# Patient Record
Sex: MALE | Race: WHITE | Employment: OTHER | URBAN - METROPOLITAN AREA
[De-identification: names, ages, dates, MRNs, and addresses within clinical notes are randomized per-mention and may not be internally consistent; named-entity substitution may affect disease eponyms.]

---

## 2017-01-10 RX ORDER — METOPROLOL TARTRATE 50 MG/1
TABLET, FILM COATED ORAL
Qty: 180 TABLET | Refills: 1 | Status: SHIPPED | OUTPATIENT
Start: 2017-01-10 | End: 2017-06-03 | Stop reason: SDUPTHER

## 2017-02-16 DIAGNOSIS — R73.9 BLOOD GLUCOSE ELEVATED: ICD-10-CM

## 2017-02-16 DIAGNOSIS — E78.2 MIXED HYPERLIPIDEMIA: Primary | ICD-10-CM

## 2017-02-23 ENCOUNTER — OFFICE VISIT (OUTPATIENT)
Dept: CARDIOLOGY CLINIC | Age: 82
End: 2017-02-23

## 2017-02-23 VITALS
HEART RATE: 61 BPM | BODY MASS INDEX: 27.58 KG/M2 | SYSTOLIC BLOOD PRESSURE: 120 MMHG | WEIGHT: 182 LBS | DIASTOLIC BLOOD PRESSURE: 64 MMHG | HEIGHT: 68 IN

## 2017-02-23 DIAGNOSIS — I65.23 BILATERAL CAROTID ARTERY STENOSIS: ICD-10-CM

## 2017-02-23 DIAGNOSIS — E78.5 HYPERLIPIDEMIA, UNSPECIFIED HYPERLIPIDEMIA TYPE: ICD-10-CM

## 2017-02-23 DIAGNOSIS — I25.110 ATHEROSCLEROSIS OF NATIVE CORONARY ARTERY OF NATIVE HEART WITH UNSTABLE ANGINA PECTORIS (HCC): ICD-10-CM

## 2017-02-23 DIAGNOSIS — I10 ESSENTIAL HYPERTENSION, BENIGN: Primary | ICD-10-CM

## 2017-02-23 PROCEDURE — G8427 DOCREV CUR MEDS BY ELIG CLIN: HCPCS | Performed by: INTERNAL MEDICINE

## 2017-02-23 PROCEDURE — 99214 OFFICE O/P EST MOD 30 MIN: CPT | Performed by: INTERNAL MEDICINE

## 2017-02-23 PROCEDURE — G8598 ASA/ANTIPLAT THER USED: HCPCS | Performed by: INTERNAL MEDICINE

## 2017-02-23 PROCEDURE — 1123F ACP DISCUSS/DSCN MKR DOCD: CPT | Performed by: INTERNAL MEDICINE

## 2017-02-23 PROCEDURE — G8420 CALC BMI NORM PARAMETERS: HCPCS | Performed by: INTERNAL MEDICINE

## 2017-02-23 PROCEDURE — 93000 ELECTROCARDIOGRAM COMPLETE: CPT | Performed by: INTERNAL MEDICINE

## 2017-02-23 PROCEDURE — 4040F PNEUMOC VAC/ADMIN/RCVD: CPT | Performed by: INTERNAL MEDICINE

## 2017-02-23 PROCEDURE — 1036F TOBACCO NON-USER: CPT | Performed by: INTERNAL MEDICINE

## 2017-02-23 PROCEDURE — G8484 FLU IMMUNIZE NO ADMIN: HCPCS | Performed by: INTERNAL MEDICINE

## 2017-03-03 ENCOUNTER — HOSPITAL ENCOUNTER (OUTPATIENT)
Dept: OTHER | Age: 82
Discharge: HOME | End: 2017-03-03
Attending: INTERNAL MEDICINE | Admitting: INTERNAL MEDICINE

## 2017-03-09 ENCOUNTER — PROCEDURE VISIT (OUTPATIENT)
Dept: CARDIOLOGY CLINIC | Age: 82
End: 2017-03-09

## 2017-03-09 DIAGNOSIS — I49.5 SINOATRIAL NODE DYSFUNCTION (HCC): ICD-10-CM

## 2017-03-09 DIAGNOSIS — Z95.0 PACEMAKER: ICD-10-CM

## 2017-03-09 PROCEDURE — 93280 PM DEVICE PROGR EVAL DUAL: CPT | Performed by: INTERNAL MEDICINE

## 2017-04-17 RX ORDER — ATORVASTATIN CALCIUM 20 MG/1
TABLET, FILM COATED ORAL
Qty: 90 TABLET | Refills: 2 | Status: SHIPPED | OUTPATIENT
Start: 2017-04-17 | End: 2018-01-24 | Stop reason: SDUPTHER

## 2017-06-05 RX ORDER — METOPROLOL TARTRATE 50 MG/1
TABLET, FILM COATED ORAL
Qty: 180 TABLET | Refills: 3 | Status: SHIPPED | OUTPATIENT
Start: 2017-06-05 | End: 2018-06-11 | Stop reason: SDUPTHER

## 2017-06-29 ENCOUNTER — PROCEDURE VISIT (OUTPATIENT)
Dept: CARDIOLOGY CLINIC | Age: 82
End: 2017-06-29

## 2017-06-29 DIAGNOSIS — I49.5 SINOATRIAL NODE DYSFUNCTION (HCC): ICD-10-CM

## 2017-06-29 DIAGNOSIS — Z95.0 PACEMAKER: ICD-10-CM

## 2017-06-29 PROCEDURE — 93280 PM DEVICE PROGR EVAL DUAL: CPT | Performed by: INTERNAL MEDICINE

## 2017-08-18 DIAGNOSIS — E78.5 HYPERLIPIDEMIA, UNSPECIFIED HYPERLIPIDEMIA TYPE: Primary | ICD-10-CM

## 2017-08-24 ENCOUNTER — OFFICE VISIT (OUTPATIENT)
Dept: CARDIOLOGY CLINIC | Age: 82
End: 2017-08-24

## 2017-08-24 VITALS
SYSTOLIC BLOOD PRESSURE: 130 MMHG | BODY MASS INDEX: 27.43 KG/M2 | WEIGHT: 181 LBS | DIASTOLIC BLOOD PRESSURE: 72 MMHG | HEART RATE: 60 BPM | HEIGHT: 68 IN

## 2017-08-24 DIAGNOSIS — E78.5 HYPERLIPIDEMIA, UNSPECIFIED HYPERLIPIDEMIA TYPE: ICD-10-CM

## 2017-08-24 DIAGNOSIS — I25.110 ATHEROSCLEROSIS OF NATIVE CORONARY ARTERY OF NATIVE HEART WITH UNSTABLE ANGINA PECTORIS (HCC): Primary | ICD-10-CM

## 2017-08-24 DIAGNOSIS — I10 ESSENTIAL HYPERTENSION, BENIGN: ICD-10-CM

## 2017-08-24 PROCEDURE — G8419 CALC BMI OUT NRM PARAM NOF/U: HCPCS | Performed by: INTERNAL MEDICINE

## 2017-08-24 PROCEDURE — 99214 OFFICE O/P EST MOD 30 MIN: CPT | Performed by: INTERNAL MEDICINE

## 2017-08-24 PROCEDURE — 1036F TOBACCO NON-USER: CPT | Performed by: INTERNAL MEDICINE

## 2017-08-24 PROCEDURE — 4040F PNEUMOC VAC/ADMIN/RCVD: CPT | Performed by: INTERNAL MEDICINE

## 2017-08-24 PROCEDURE — 1123F ACP DISCUSS/DSCN MKR DOCD: CPT | Performed by: INTERNAL MEDICINE

## 2017-08-24 PROCEDURE — G8598 ASA/ANTIPLAT THER USED: HCPCS | Performed by: INTERNAL MEDICINE

## 2017-08-24 PROCEDURE — G8427 DOCREV CUR MEDS BY ELIG CLIN: HCPCS | Performed by: INTERNAL MEDICINE

## 2017-09-21 ENCOUNTER — PROCEDURE VISIT (OUTPATIENT)
Dept: CARDIOLOGY CLINIC | Age: 82
End: 2017-09-21

## 2017-09-21 DIAGNOSIS — I49.5 SINOATRIAL NODE DYSFUNCTION (HCC): ICD-10-CM

## 2017-09-21 DIAGNOSIS — Z95.0 PACEMAKER: ICD-10-CM

## 2017-09-21 PROCEDURE — 93280 PM DEVICE PROGR EVAL DUAL: CPT | Performed by: INTERNAL MEDICINE

## 2018-01-11 ENCOUNTER — PROCEDURE VISIT (OUTPATIENT)
Dept: CARDIOLOGY CLINIC | Age: 83
End: 2018-01-11

## 2018-01-11 DIAGNOSIS — I49.5 SINOATRIAL NODE DYSFUNCTION (HCC): ICD-10-CM

## 2018-01-11 DIAGNOSIS — Z95.0 PACEMAKER: ICD-10-CM

## 2018-01-11 PROCEDURE — 93280 PM DEVICE PROGR EVAL DUAL: CPT | Performed by: INTERNAL MEDICINE

## 2018-01-24 RX ORDER — ATORVASTATIN CALCIUM 20 MG/1
TABLET, FILM COATED ORAL
Qty: 90 TABLET | Refills: 3 | Status: SHIPPED | OUTPATIENT
Start: 2018-01-24 | End: 2018-12-14 | Stop reason: SDUPTHER

## 2018-02-12 DIAGNOSIS — E78.5 HYPERLIPIDEMIA, UNSPECIFIED HYPERLIPIDEMIA TYPE: Primary | ICD-10-CM

## 2018-02-15 ENCOUNTER — OFFICE VISIT (OUTPATIENT)
Dept: CARDIOLOGY CLINIC | Age: 83
End: 2018-02-15

## 2018-02-15 VITALS
SYSTOLIC BLOOD PRESSURE: 120 MMHG | HEART RATE: 60 BPM | WEIGHT: 180 LBS | HEIGHT: 68 IN | BODY MASS INDEX: 27.28 KG/M2 | DIASTOLIC BLOOD PRESSURE: 62 MMHG

## 2018-02-15 DIAGNOSIS — I10 ESSENTIAL HYPERTENSION, BENIGN: ICD-10-CM

## 2018-02-15 DIAGNOSIS — E78.5 HYPERLIPIDEMIA, UNSPECIFIED HYPERLIPIDEMIA TYPE: ICD-10-CM

## 2018-02-15 DIAGNOSIS — I25.110 ATHEROSCLEROSIS OF NATIVE CORONARY ARTERY OF NATIVE HEART WITH UNSTABLE ANGINA PECTORIS (HCC): Primary | ICD-10-CM

## 2018-02-15 PROCEDURE — 4040F PNEUMOC VAC/ADMIN/RCVD: CPT | Performed by: INTERNAL MEDICINE

## 2018-02-15 PROCEDURE — 1036F TOBACCO NON-USER: CPT | Performed by: INTERNAL MEDICINE

## 2018-02-15 PROCEDURE — G8428 CUR MEDS NOT DOCUMENT: HCPCS | Performed by: INTERNAL MEDICINE

## 2018-02-15 PROCEDURE — G8484 FLU IMMUNIZE NO ADMIN: HCPCS | Performed by: INTERNAL MEDICINE

## 2018-02-15 PROCEDURE — G8419 CALC BMI OUT NRM PARAM NOF/U: HCPCS | Performed by: INTERNAL MEDICINE

## 2018-02-15 PROCEDURE — 99214 OFFICE O/P EST MOD 30 MIN: CPT | Performed by: INTERNAL MEDICINE

## 2018-02-15 PROCEDURE — 1123F ACP DISCUSS/DSCN MKR DOCD: CPT | Performed by: INTERNAL MEDICINE

## 2018-02-15 PROCEDURE — G8598 ASA/ANTIPLAT THER USED: HCPCS | Performed by: INTERNAL MEDICINE

## 2018-02-15 NOTE — LETTER
[address] Cardiology [facility]  [address] [place]. Pushpa Patino [facility] [address]  Phone: [phone]  Fax: [phone]    Lisseth Degroot MD        February 20, 2018     [facility]ian [facility] [address]    Patient: Heriberto Pizano  MR Number: [id number]  YOB: 1934  Date of Visit: 2/15/2018    Dear Dr. Hamm:    Thank you for the request for consultation for Ely Betsy to me for the evaluation of Mr Aleah Dunham. Below are the relevant portions of my assessment and plan of care. [facility]   Cardiac Evaluation      Patient: Heriberto Pizano  YOB: 1934  Date: 2/15/18       Chief Complaint   Patient presents with    Coronary Artery Disease    Hyperlipidemia    Hypertension        Referring provider: MD Hansel    History of Present Illness:   Mr Aleah Dunham comes to the office today for follow up of his coronary disease and hypertension. He has a history of coronary disease with CABG in 2005. He is treated for hypertension and hyperlipidemia. He had a nuclear stress March, 2013. Mr Aleah Dunham had a pacemaker placed 11/14 at [place] [address] after a syncopal episode with HR 30's. Mr Aleah Dunham had been following with both me and [place] [address] cardiologists for pacemaker checks and office visits. He is now just following with me. Today, Mr Aleah Dunham states he has been well. He denies any chest pain, palpitations, SANDHU, dizziness, or edema. He walks his Corgi  twice a day, 6 blocks each. Past Medical History:   has a past medical history of Asthma; CAD (coronary artery disease); and Hyperlipidemia. Surgical History:   has a past surgical history that includes Coronary artery bypass graft. Social History:   reports that he has never smoked. He has never used smokeless tobacco. He reports that he does not drink alcohol or use drugs. Family History:  family history includes Heart Disease in his father and mother.      Allergies:

## 2018-02-15 NOTE — PROGRESS NOTES
[facility]   Cardiac Evaluation      Patient: Estrellita Mcclendon  YOB: 1934  Date: 2/15/18       Chief Complaint   Patient presents with    Coronary Artery Disease    Hyperlipidemia    Hypertension        Referring provider: Liliam Ch MD    History of Present Illness:   Mr Olivia Ortega comes to the office today for follow up of his coronary disease and hypertension. He has a history of coronary disease with CABG in 2005. He is treated for hypertension and hyperlipidemia. He had a nuclear stress March, 2013. Mr Olivia Ortega had a pacemaker placed 11/14 at [facility] after a syncopal episode with HR 30's. Mr Olivia Ortega had been following with both me and [facility] cardiologists for pacemaker checks and office visits. He is now just following with me. Today, Mr Olivia Ortega states he has been well. He denies any chest pain, palpitations, SANDHU, dizziness, or edema. He walks his Corgi  twice a day, 6 blocks each. Past Medical History:   has a past medical history of Asthma; CAD (coronary artery disease); and Hyperlipidemia. Surgical History:   has a past surgical history that includes Coronary artery bypass graft. Social History:   reports that he has never smoked. He has never used smokeless tobacco. He reports that he does not drink alcohol or use drugs. Family History:  family history includes Heart Disease in his father and mother. Allergies:  Gabapentin     Review of Systems:   · Constitutional: there has been no unanticipated weight loss. No change in energy or activity level   · Eyes: No visual changes   · ENT: No Headaches, hearing loss or vertigo. No mouth sores or sore throat. · Cardiovascular: Reviewed in HPI  · Respiratory: No cough or wheezing, no sputum production. No hematemesis. · Gastrointestinal: No abdominal pain, appetite loss, blood in stools. No change in bowel or bladder habits.   · Genitourinary: No nocturia, dysuria, trouble voiding  · Musculoskeletal:  No gait disturbance, weakness or joint complaints. · Integumentary: No rash or pruritis. · Neurological: No headache, change in muscle strength, numbness or tingling. No change in gait, balance, coordination, mood, affect, memory, mentation, behavior. · Psychiatric: No anxiety or depression  · Endocrine: No malaise or fever  · Hematologic/Lymphatic: No abnormal bruising or bleeding, blood clots or swollen lymph nodes. · Allergic/Immunologic: No nasal congestion or hives. Physical Examination:    Vitals:    02/15/18 1028   BP: 120/62   Site: Left Arm   Position: Sitting   Cuff Size: Medium Adult   Pulse: 60   Weight: 180 lb (81.6 kg)   Height: 5' 8\" (1.727 m)     Wt Readings from Last 3 Encounters:   02/15/18 180 lb (81.6 kg)   08/24/17 181 lb (82.1 kg)   02/23/17 182 lb (82.6 kg)     BP Readings from Last 3 Encounters:   02/15/18 120/62   08/24/17 130/72   02/23/17 120/64     Constitutional and General Appearance:  appears stated age  Respiratory:  · Normal excursion and expansion without use of accessory muscles  · Resp Auscultation: Normal breath sounds without dullness  Cardiovascular:  · The apical impulses not displaced  · Heart is regular rate and rhythm with normal S1, S2  · The carotid upstroke is normal, no bruit noted   · JVP is not elevated  · Peripheral pulses are symmetrical  · There is no clubbing, cyanosis of the extremities  · No edema  · Femoral Arteries: 2+ and equal  · Pedal Pulses: 2+ and equal   Abdomen:  · No masses or tenderness  · Normal bowel sounds  Neurological/Psychiatric:  · Alert and oriented x3  · Moves all extremities well  · Exhibits normal gait balance and coordination      Assessment/Plan  1. Other symptoms involving cardiovascular system -stable  Carotid doppler 3/17: 1-15% bilateral  Carotid doppler 3/27/12: 16-49% bilateral stenosis  Carotid doppler 2/10: mild (<40%) bilaterally   2. Other and unspecified hyperlipidemia -stable, labs 2/13/18: ; TRIG 235; HDL 32; LDL 82   3.

## 2018-02-20 NOTE — COMMUNICATION BODY
[address]   Cardiac Evaluation      Patient: Koko Chand  YOB: 1934  Date: 2/15/18       Chief Complaint   Patient presents with    Coronary Artery Disease    Hyperlipidemia    Hypertension        Referring provider: Belen Lobato MD    History of Present Illness:   Mr Nikki Covington comes to the office today for follow up of his coronary disease and hypertension. He has a history of coronary disease with CABG in 2005. He is treated for hypertension and hyperlipidemia. He had a nuclear stress March, 2013. Mr Nikki Covington had a pacemaker placed 11/14 at [facility] after a syncopal episode with HR 30's. Mr Nikki Covington had been following with both me and [facility] cardiologists for pacemaker checks and office visits. He is now just following with me. Today, Mr Nikki Covington states he has been well. He denies any chest pain, palpitations, SANDHU, dizziness, or edema. He walks his Corgi  twice a day, 6 blocks each. Past Medical History:   has a past medical history of Asthma; CAD (coronary artery disease); and Hyperlipidemia. Surgical History:   has a past surgical history that includes Coronary artery bypass graft. Social History:   reports that he has never smoked. He has never used smokeless tobacco. He reports that he does not drink alcohol or use drugs. Family History:  family history includes Heart Disease in his father and mother. Allergies:  Gabapentin     Review of Systems:   · Constitutional: there has been no unanticipated weight loss. No change in energy or activity level   · Eyes: No visual changes   · ENT: No Headaches, hearing loss or vertigo. No mouth sores or sore throat. · Cardiovascular: Reviewed in HPI  · Respiratory: No cough or wheezing, no sputum production. No hematemesis. · Gastrointestinal: No abdominal pain, appetite loss, blood in stools. No change in bowel or bladder habits.   · Genitourinary: No nocturia, dysuria, trouble voiding  · Musculoskeletal:  No gait disturbance, weakness or joint complaints. · Integumentary: No rash or pruritis. · Neurological: No headache, change in muscle strength, numbness or tingling. No change in gait, balance, coordination, mood, affect, memory, mentation, behavior. · Psychiatric: No anxiety or depression  · Endocrine: No malaise or fever  · Hematologic/Lymphatic: No abnormal bruising or bleeding, blood clots or swollen lymph nodes. · Allergic/Immunologic: No nasal congestion or hives. Physical Examination:    Vitals:    02/15/18 1028   BP: 120/62   Site: Left Arm   Position: Sitting   Cuff Size: Medium Adult   Pulse: 60   Weight: 180 lb (81.6 kg)   Height: 5' 8\" (1.727 m)     Wt Readings from Last 3 Encounters:   02/15/18 180 lb (81.6 kg)   08/24/17 181 lb (82.1 kg)   02/23/17 182 lb (82.6 kg)     BP Readings from Last 3 Encounters:   02/15/18 120/62   08/24/17 130/72   02/23/17 120/64     Constitutional and General Appearance:  appears stated age  Respiratory:  · Normal excursion and expansion without use of accessory muscles  · Resp Auscultation: Normal breath sounds without dullness  Cardiovascular:  · The apical impulses not displaced  · Heart is regular rate and rhythm with normal S1, S2  · The carotid upstroke is normal, no bruit noted   · JVP is not elevated  · Peripheral pulses are symmetrical  · There is no clubbing, cyanosis of the extremities  · No edema  · Femoral Arteries: 2+ and equal  · Pedal Pulses: 2+ and equal   Abdomen:  · No masses or tenderness  · Normal bowel sounds  Neurological/Psychiatric:  · Alert and oriented x3  · Moves all extremities well  · Exhibits normal gait balance and coordination      Assessment/Plan  1. Other symptoms involving cardiovascular system -stable  Carotid doppler 3/17: 1-15% bilateral  Carotid doppler 3/27/12: 16-49% bilateral stenosis  Carotid doppler 2/10: mild (<40%) bilaterally   2. Other and unspecified hyperlipidemia -stable, labs 2/13/18: ; TRIG 235; HDL 32; LDL 82   3.

## 2018-04-12 ENCOUNTER — PROCEDURE VISIT (OUTPATIENT)
Dept: CARDIOLOGY CLINIC | Age: 83
End: 2018-04-12

## 2018-04-12 DIAGNOSIS — I49.5 SINOATRIAL NODE DYSFUNCTION (HCC): ICD-10-CM

## 2018-04-12 DIAGNOSIS — Z95.0 PACEMAKER: ICD-10-CM

## 2018-04-12 PROCEDURE — 93280 PM DEVICE PROGR EVAL DUAL: CPT | Performed by: INTERNAL MEDICINE

## 2018-04-16 ENCOUNTER — NURSE ONLY (OUTPATIENT)
Dept: CARDIOLOGY CLINIC | Age: 83
End: 2018-04-16

## 2018-04-16 VITALS — SYSTOLIC BLOOD PRESSURE: 130 MMHG | DIASTOLIC BLOOD PRESSURE: 70 MMHG

## 2018-06-11 RX ORDER — METOPROLOL TARTRATE 50 MG/1
TABLET, FILM COATED ORAL
Qty: 180 TABLET | Refills: 3 | Status: SHIPPED | OUTPATIENT
Start: 2018-06-11 | End: 2019-01-25 | Stop reason: SDUPTHER

## 2018-08-27 DIAGNOSIS — E78.5 HYPERLIPIDEMIA, UNSPECIFIED HYPERLIPIDEMIA TYPE: Primary | ICD-10-CM

## 2018-08-29 LAB
ALT SERPL-CCNC: 32 U/L (ref 9–46)
AST SERPL-CCNC: 27 U/L (ref 10–35)
CHOLESTEROL, TOTAL: 126 MG/DL
CHOLESTEROL/HDL RATIO: 3.8 (CALC)
CHOLESTEROL: 93 MG/DL (CALC)
HDLC SERPL-MCNC: 33 MG/DL
LDL CHOLESTEROL CALCULATED: 67 MG/DL (CALC)
TRIGL SERPL-MCNC: 181 MG/DL

## 2018-08-30 ENCOUNTER — OFFICE VISIT (OUTPATIENT)
Dept: CARDIOLOGY CLINIC | Age: 83
End: 2018-08-30

## 2018-08-30 VITALS
HEART RATE: 56 BPM | SYSTOLIC BLOOD PRESSURE: 122 MMHG | HEIGHT: 68 IN | DIASTOLIC BLOOD PRESSURE: 70 MMHG | WEIGHT: 176 LBS | BODY MASS INDEX: 26.67 KG/M2

## 2018-08-30 DIAGNOSIS — E78.49 OTHER HYPERLIPIDEMIA: ICD-10-CM

## 2018-08-30 DIAGNOSIS — I65.23 BILATERAL CAROTID ARTERY STENOSIS: ICD-10-CM

## 2018-08-30 DIAGNOSIS — I25.10 ATHEROSCLEROSIS OF NATIVE CORONARY ARTERY OF NATIVE HEART WITHOUT ANGINA PECTORIS: ICD-10-CM

## 2018-08-30 DIAGNOSIS — I10 ESSENTIAL HYPERTENSION, BENIGN: ICD-10-CM

## 2018-08-30 DIAGNOSIS — Z95.0 PACEMAKER: ICD-10-CM

## 2018-08-30 DIAGNOSIS — R73.09 ELEVATED GLUCOSE: Primary | ICD-10-CM

## 2018-08-30 PROCEDURE — 1123F ACP DISCUSS/DSCN MKR DOCD: CPT | Performed by: INTERNAL MEDICINE

## 2018-08-30 PROCEDURE — 99214 OFFICE O/P EST MOD 30 MIN: CPT | Performed by: INTERNAL MEDICINE

## 2018-08-30 PROCEDURE — 1036F TOBACCO NON-USER: CPT | Performed by: INTERNAL MEDICINE

## 2018-08-30 PROCEDURE — G8419 CALC BMI OUT NRM PARAM NOF/U: HCPCS | Performed by: INTERNAL MEDICINE

## 2018-08-30 PROCEDURE — G8598 ASA/ANTIPLAT THER USED: HCPCS | Performed by: INTERNAL MEDICINE

## 2018-08-30 PROCEDURE — 1101F PT FALLS ASSESS-DOCD LE1/YR: CPT | Performed by: INTERNAL MEDICINE

## 2018-08-30 PROCEDURE — 4040F PNEUMOC VAC/ADMIN/RCVD: CPT | Performed by: INTERNAL MEDICINE

## 2018-08-30 PROCEDURE — G8427 DOCREV CUR MEDS BY ELIG CLIN: HCPCS | Performed by: INTERNAL MEDICINE

## 2018-08-30 NOTE — LETTER
· Constitutional: there has been no unanticipated weight loss. No change in energy or activity level   · Eyes: No visual changes   · ENT: No Headaches, hearing loss or vertigo. No mouth sores or sore throat. · Cardiovascular: Reviewed in HPI  · Respiratory: No cough or wheezing, no sputum production. No hematemesis. · Gastrointestinal: No abdominal pain, appetite loss, blood in stools. No change in bowel or bladder habits. · Genitourinary: No nocturia, dysuria, trouble voiding  · Musculoskeletal:  No gait disturbance, weakness or joint complaints. · Integumentary: No rash or pruritis. · Neurological: No headache, change in muscle strength, numbness or tingling. No change in gait, balance, coordination, mood, affect, memory, mentation, behavior. · Psychiatric: No anxiety or depression  · Endocrine: No malaise or fever  · Hematologic/Lymphatic: No abnormal bruising or bleeding, blood clots or swollen lymph nodes. · Allergic/Immunologic: No nasal congestion or hives.     Physical Examination:    Vitals:    08/30/18 1025   BP: 122/70   Site: Right Arm   Position: Sitting   Cuff Size: Medium Adult   Pulse: 56   Weight: 176 lb (79.8 kg)   Height: 5' 8\" (1.727 m)     Wt Readings from Last 3 Encounters:   02/15/18 180 lb (81.6 kg)   08/24/17 181 lb (82.1 kg)   02/23/17 182 lb (82.6 kg)     BP Readings from Last 3 Encounters:   04/16/18 130/70   02/15/18 120/62   08/24/17 130/72     Constitutional and General Appearance:  appears stated age  Respiratory:  · Normal excursion and expansion without use of accessory muscles  · Resp Auscultation: Normal breath sounds without dullness  Cardiovascular:  · The apical impulses not displaced  · Heart is regular rate and rhythm with normal S1, S2  · The carotid upstroke is normal, no bruit noted   · JVP is not elevated  · Peripheral pulses are symmetrical  · There is no clubbing, cyanosis of the extremities  · No edema  · Femoral Arteries: 2+ and equal

## 2018-08-30 NOTE — COMMUNICATION BODY
trouble voiding  · Musculoskeletal:  No gait disturbance, weakness or joint complaints. · Integumentary: No rash or pruritis. · Neurological: No headache, change in muscle strength, numbness or tingling. No change in gait, balance, coordination, mood, affect, memory, mentation, behavior. · Psychiatric: No anxiety or depression  · Endocrine: No malaise or fever  · Hematologic/Lymphatic: No abnormal bruising or bleeding, blood clots or swollen lymph nodes. · Allergic/Immunologic: No nasal congestion or hives. Physical Examination:    Vitals:    08/30/18 1025   BP: 122/70   Site: Right Arm   Position: Sitting   Cuff Size: Medium Adult   Pulse: 56   Weight: 176 lb (79.8 kg)   Height: 5' 8\" (1.727 m)     Wt Readings from Last 3 Encounters:   02/15/18 180 lb (81.6 kg)   08/24/17 181 lb (82.1 kg)   02/23/17 182 lb (82.6 kg)     BP Readings from Last 3 Encounters:   04/16/18 130/70   02/15/18 120/62   08/24/17 130/72     Constitutional and General Appearance:  appears stated age  Respiratory:  · Normal excursion and expansion without use of accessory muscles  · Resp Auscultation: Normal breath sounds without dullness  Cardiovascular:  · The apical impulses not displaced  · Heart is regular rate and rhythm with normal S1, S2  · The carotid upstroke is normal, no bruit noted   · JVP is not elevated  · Peripheral pulses are symmetrical  · There is no clubbing, cyanosis of the extremities  · No edema  · Femoral Arteries: 2+ and equal  · Pedal Pulses: 2+ and equal   Abdomen:  · No masses or tenderness  · Normal bowel sounds  Neurological/Psychiatric:  · Alert and oriented x3  · Moves all extremities well  · Exhibits normal gait balance and coordination    Assessment:  1.  Coronary  artery disease: Stable, no anginal symptoms  CABG 6/3/05> LIMA-LAD, SVG-diagonal and OM, SVG-posterior ventricular and posterior descending branches of RC system    --GXT Edvin 11/14> ([facility]) Abnormal myocardial perfusion study with a large severe

## 2018-08-31 ENCOUNTER — TELEPHONE (OUTPATIENT)
Dept: CARDIOLOGY CLINIC | Age: 83
End: 2018-08-31

## 2018-09-20 ENCOUNTER — PROCEDURE VISIT (OUTPATIENT)
Dept: CARDIOLOGY CLINIC | Age: 83
End: 2018-09-20

## 2018-09-20 DIAGNOSIS — I49.5 SINOATRIAL NODE DYSFUNCTION (HCC): ICD-10-CM

## 2018-09-20 DIAGNOSIS — Z95.0 PACEMAKER: ICD-10-CM

## 2018-09-20 PROCEDURE — 93280 PM DEVICE PROGR EVAL DUAL: CPT | Performed by: INTERNAL MEDICINE

## 2018-12-14 RX ORDER — ATORVASTATIN CALCIUM 20 MG/1
TABLET, FILM COATED ORAL
Qty: 90 TABLET | Refills: 3 | Status: SHIPPED | OUTPATIENT
Start: 2018-12-14 | End: 2019-01-02 | Stop reason: SDUPTHER

## 2019-01-02 RX ORDER — ATORVASTATIN CALCIUM 20 MG/1
TABLET, FILM COATED ORAL
Qty: 90 TABLET | Refills: 3 | Status: SHIPPED | OUTPATIENT
Start: 2019-01-02 | End: 2019-09-05

## 2019-01-10 ENCOUNTER — PROCEDURE VISIT (OUTPATIENT)
Dept: CARDIOLOGY CLINIC | Age: 84
End: 2019-01-10
Payer: MEDICARE

## 2019-01-10 DIAGNOSIS — I49.5 SINOATRIAL NODE DYSFUNCTION (HCC): ICD-10-CM

## 2019-01-10 DIAGNOSIS — Z95.0 PACEMAKER: ICD-10-CM

## 2019-01-10 PROCEDURE — 93280 PM DEVICE PROGR EVAL DUAL: CPT | Performed by: INTERNAL MEDICINE

## 2019-01-25 RX ORDER — METOPROLOL TARTRATE 50 MG/1
TABLET, FILM COATED ORAL
Qty: 180 TABLET | Refills: 3 | Status: SHIPPED | OUTPATIENT
Start: 2019-01-25 | End: 2019-10-30 | Stop reason: SDUPTHER

## 2019-02-18 DIAGNOSIS — E78.49 OTHER HYPERLIPIDEMIA: Primary | ICD-10-CM

## 2019-02-20 ENCOUNTER — ANTI-COAG VISIT (OUTPATIENT)
Dept: CARDIOLOGY CLINIC | Age: 84
End: 2019-02-20

## 2019-02-21 ENCOUNTER — OFFICE VISIT (OUTPATIENT)
Dept: CARDIOLOGY CLINIC | Age: 84
End: 2019-02-21
Payer: MEDICARE

## 2019-02-21 VITALS
SYSTOLIC BLOOD PRESSURE: 144 MMHG | BODY MASS INDEX: 26.67 KG/M2 | OXYGEN SATURATION: 98 % | WEIGHT: 176 LBS | DIASTOLIC BLOOD PRESSURE: 72 MMHG | HEART RATE: 60 BPM | HEIGHT: 68 IN

## 2019-02-21 DIAGNOSIS — E78.49 OTHER HYPERLIPIDEMIA: ICD-10-CM

## 2019-02-21 DIAGNOSIS — Z95.0 PACEMAKER: ICD-10-CM

## 2019-02-21 DIAGNOSIS — I10 ESSENTIAL HYPERTENSION, BENIGN: ICD-10-CM

## 2019-02-21 DIAGNOSIS — I25.10 ATHEROSCLEROSIS OF NATIVE CORONARY ARTERY OF NATIVE HEART WITHOUT ANGINA PECTORIS: Primary | ICD-10-CM

## 2019-02-21 PROCEDURE — 1101F PT FALLS ASSESS-DOCD LE1/YR: CPT | Performed by: INTERNAL MEDICINE

## 2019-02-21 PROCEDURE — G8484 FLU IMMUNIZE NO ADMIN: HCPCS | Performed by: INTERNAL MEDICINE

## 2019-02-21 PROCEDURE — 1036F TOBACCO NON-USER: CPT | Performed by: INTERNAL MEDICINE

## 2019-02-21 PROCEDURE — G8419 CALC BMI OUT NRM PARAM NOF/U: HCPCS | Performed by: INTERNAL MEDICINE

## 2019-02-21 PROCEDURE — 4040F PNEUMOC VAC/ADMIN/RCVD: CPT | Performed by: INTERNAL MEDICINE

## 2019-02-21 PROCEDURE — 1123F ACP DISCUSS/DSCN MKR DOCD: CPT | Performed by: INTERNAL MEDICINE

## 2019-02-21 PROCEDURE — G8598 ASA/ANTIPLAT THER USED: HCPCS | Performed by: INTERNAL MEDICINE

## 2019-02-21 PROCEDURE — G8427 DOCREV CUR MEDS BY ELIG CLIN: HCPCS | Performed by: INTERNAL MEDICINE

## 2019-02-21 PROCEDURE — 99214 OFFICE O/P EST MOD 30 MIN: CPT | Performed by: INTERNAL MEDICINE

## 2019-04-25 ENCOUNTER — PROCEDURE VISIT (OUTPATIENT)
Dept: CARDIOLOGY CLINIC | Age: 84
End: 2019-04-25
Payer: MEDICARE

## 2019-04-25 DIAGNOSIS — I49.5 SINOATRIAL NODE DYSFUNCTION (HCC): ICD-10-CM

## 2019-04-25 DIAGNOSIS — Z95.0 PACEMAKER: ICD-10-CM

## 2019-04-25 PROCEDURE — 93280 PM DEVICE PROGR EVAL DUAL: CPT | Performed by: INTERNAL MEDICINE

## 2019-04-25 NOTE — PROGRESS NOTES
Patient comes in for programming evaluation for his pacemaker. All sensing and pacing parameters are within normal range. Noted NSVT, pt is on Lopressor. No changes need to be made at this time. Please see interrogation for more detail. Patient will follow up in 3 months in office or remotely.

## 2019-07-25 ENCOUNTER — NURSE ONLY (OUTPATIENT)
Dept: CARDIOLOGY CLINIC | Age: 84
End: 2019-07-25
Payer: MEDICARE

## 2019-07-25 DIAGNOSIS — Z95.0 PACEMAKER: ICD-10-CM

## 2019-07-25 DIAGNOSIS — I49.5 SINOATRIAL NODE DYSFUNCTION (HCC): ICD-10-CM

## 2019-07-25 PROCEDURE — 93280 PM DEVICE PROGR EVAL DUAL: CPT | Performed by: INTERNAL MEDICINE

## 2019-09-03 ENCOUNTER — TELEPHONE (OUTPATIENT)
Dept: CARDIOLOGY CLINIC | Age: 84
End: 2019-09-03

## 2019-09-03 DIAGNOSIS — E78.49 OTHER HYPERLIPIDEMIA: Primary | ICD-10-CM

## 2019-09-05 ENCOUNTER — OFFICE VISIT (OUTPATIENT)
Dept: CARDIOLOGY CLINIC | Age: 84
End: 2019-09-05
Payer: MEDICARE

## 2019-09-05 VITALS
HEIGHT: 68 IN | OXYGEN SATURATION: 98 % | SYSTOLIC BLOOD PRESSURE: 124 MMHG | HEART RATE: 67 BPM | WEIGHT: 169 LBS | DIASTOLIC BLOOD PRESSURE: 60 MMHG | BODY MASS INDEX: 25.61 KG/M2

## 2019-09-05 DIAGNOSIS — E78.49 OTHER HYPERLIPIDEMIA: Primary | ICD-10-CM

## 2019-09-05 DIAGNOSIS — I25.10 ATHEROSCLEROSIS OF NATIVE CORONARY ARTERY OF NATIVE HEART WITHOUT ANGINA PECTORIS: ICD-10-CM

## 2019-09-05 DIAGNOSIS — I10 ESSENTIAL HYPERTENSION, BENIGN: ICD-10-CM

## 2019-09-05 LAB — CHOLESTEROL: 199 MG/DL (CALC)

## 2019-09-05 PROCEDURE — 4040F PNEUMOC VAC/ADMIN/RCVD: CPT | Performed by: INTERNAL MEDICINE

## 2019-09-05 PROCEDURE — G8419 CALC BMI OUT NRM PARAM NOF/U: HCPCS | Performed by: INTERNAL MEDICINE

## 2019-09-05 PROCEDURE — 1123F ACP DISCUSS/DSCN MKR DOCD: CPT | Performed by: INTERNAL MEDICINE

## 2019-09-05 PROCEDURE — 99214 OFFICE O/P EST MOD 30 MIN: CPT | Performed by: INTERNAL MEDICINE

## 2019-09-05 PROCEDURE — G8598 ASA/ANTIPLAT THER USED: HCPCS | Performed by: INTERNAL MEDICINE

## 2019-09-05 PROCEDURE — 1036F TOBACCO NON-USER: CPT | Performed by: INTERNAL MEDICINE

## 2019-09-05 PROCEDURE — G8427 DOCREV CUR MEDS BY ELIG CLIN: HCPCS | Performed by: INTERNAL MEDICINE

## 2019-09-05 RX ORDER — ATORVASTATIN CALCIUM 20 MG/1
TABLET, FILM COATED ORAL
Qty: 90 TABLET | Refills: 3 | Status: SHIPPED | OUTPATIENT
Start: 2019-09-05

## 2019-09-05 RX ORDER — LISINOPRIL 5 MG/1
5 TABLET ORAL
COMMUNITY
Start: 2019-07-17 | End: 2020-12-10

## 2019-09-05 RX ORDER — ATORVASTATIN CALCIUM 20 MG/1
TABLET, FILM COATED ORAL
Qty: 90 TABLET | Refills: 3
Start: 2019-09-05 | End: 2019-09-05 | Stop reason: SDUPTHER

## 2019-09-05 NOTE — LETTER
[address] [place] [address] Cardiology [facility] [place] [address]  Phone: [phone]  Fax: [phone]    Modesto Rnakin MD        September 12, 2019     MD Hansel  [address] [address]    Patient: Lisa Hobbs  MR Number: [id number]  YOB: 1934  Date of Visit: 9/5/2019    Dear Dr. Hamm:    [facility]   Cardiac Evaluation      Patient: Lisa Hobbs  YOB: 1934  Date: 9/5/19     Chief Complaint   Patient presents with    Coronary Artery Disease    Hypertension      Referring provider: MD Hansel    History of Present Illness:   Mr. Lilia Chou comes to the office today for follow up of his coronary disease, hypertension, hyperlipidemia. He has a history of coronary disease with CABG in 2005. He had a nuclear stress March, 2013. He had a pacemaker placed 11/14 at [facility] after a syncopal episode with HR 30's. He had been following with both me and [facility] cardiologists for pacemaker checks and office visits. He is now just following with me. Today, Mr. Lilia Chou states he has been ok. He has an occasional sharp pain on the side of his head. States it is rare. Savage Siegel stopped taking Lipitor because he had joel horses. He states Dr Pauline Torres told him to stop taking it. Savage Siegel denies any chest pain, palpitations, SANDHU, dizziness, or edema. Past Medical History:   has a past medical history of Asthma, CAD (coronary artery disease), and Hyperlipidemia. Surgical History:   has a past surgical history that includes Coronary artery bypass graft. Social History:   reports that he has never smoked. He has never used smokeless tobacco. He reports that he does not drink alcohol or use drugs. Family History:  family history includes Heart Disease in his father and mother. Allergies:  Gabapentin     Review of Systems:   · Constitutional: there has been no unanticipated weight loss.  No change · Normal bowel sounds  Neurological/Psychiatric:  · Alert and oriented x3  · Moves all extremities well  · Exhibits normal gait balance and coordination    Assessment:  1. Coronary  artery disease: Stable, no anginal symptoms  CABG 6/3/05> LIMA-LAD, SVG-diagonal and OM, SVG-posterior ventricular and posterior descending branches of RC system  --GXT Edvin 11/14> ([facility]) Abnormal myocardial perfusion study with a large severe perfusion abnormality inferior wall reflecting myocardial scar. EF 48%  --[facility]14[facility]  EF 45-50%, Aortic valve: Valve area: 2.05cm^2 (Vmax). Left atrium: The  atrium was mildly dilated  --GXT Edvin 3/13> No diagnostic EKG evidence for stress induced ischemia, very frequent PVC's and couplets. Moderate area of decreased perfusion in inferior wall with stress and rest consistent with scar, no evidence for reversible ischemia. EF 45%  --GXT Edvin 7/18/07> small to moderate sized inferolateral fixed defect consistent with infarction   2. Essential hypertension, benign: Stable   3. Other and unspecified hyperlipidemia: labs are 9/5/19: ; TRIG 238; HDL 33; , stopped Lipitor because he had leg cramps, advised to restart   4. Carotid stenosis: Stable  Carotid doppler 3/17> 1-15% bilateral  Carotid doppler 3/27/12> 16-49% bilateral stenosis  Carotid doppler 2/10> mild (<40%) bilaterally   5. Pacemaker, [employer]: placed at [facility] 11/2014 by Dr. Avery Chaves. PPM checked regularly in my office. Not compatible for MRI. Plan:  Lipitor discussed at length. He has been instructed to restart it. Advised to get regular exercise. FU 6 months. Scribe's attestation: This note was scribed in the presence of Dr London Friedman MD by Imelda Logan RN. The scribe's documentation has been prepared under my direction and personally reviewed by me in its entirety. I confirm that the note above accurately reflects all work, treatment, procedures, and medical decision making performed by me.

## 2019-10-31 RX ORDER — METOPROLOL TARTRATE 50 MG/1
TABLET, FILM COATED ORAL
Qty: 180 TABLET | Refills: 3 | Status: SHIPPED | OUTPATIENT
Start: 2019-10-31 | End: 2020-12-10

## 2019-11-14 ENCOUNTER — TELEPHONE (OUTPATIENT)
Dept: CARDIOLOGY CLINIC | Age: 84
End: 2019-11-14

## 2020-01-09 ENCOUNTER — NURSE ONLY (OUTPATIENT)
Dept: CARDIOLOGY CLINIC | Age: 85
End: 2020-01-09
Payer: MEDICARE

## 2020-01-09 PROCEDURE — 93280 PM DEVICE PROGR EVAL DUAL: CPT | Performed by: INTERNAL MEDICINE

## 2020-01-09 NOTE — PROGRESS NOTES
Patient comes in for programming evaluation for his pacemaker. All sensing and pacing parameters are within normal range. Noted short AT and NSVT. Pt is on Lopressor. No changes need to be made at this time. Please see interrogation for more detail. Patient will follow up in 3 months in office or remotely.

## 2020-02-20 ENCOUNTER — OFFICE VISIT (OUTPATIENT)
Dept: CARDIOLOGY CLINIC | Age: 85
End: 2020-02-20
Payer: MEDICARE

## 2020-02-20 VITALS
SYSTOLIC BLOOD PRESSURE: 130 MMHG | DIASTOLIC BLOOD PRESSURE: 60 MMHG | HEART RATE: 60 BPM | WEIGHT: 178 LBS | OXYGEN SATURATION: 98 % | HEIGHT: 68 IN | BODY MASS INDEX: 26.98 KG/M2

## 2020-02-20 PROCEDURE — G8427 DOCREV CUR MEDS BY ELIG CLIN: HCPCS | Performed by: INTERNAL MEDICINE

## 2020-02-20 PROCEDURE — 1123F ACP DISCUSS/DSCN MKR DOCD: CPT | Performed by: INTERNAL MEDICINE

## 2020-02-20 PROCEDURE — G8484 FLU IMMUNIZE NO ADMIN: HCPCS | Performed by: INTERNAL MEDICINE

## 2020-02-20 PROCEDURE — G8417 CALC BMI ABV UP PARAM F/U: HCPCS | Performed by: INTERNAL MEDICINE

## 2020-02-20 PROCEDURE — 99214 OFFICE O/P EST MOD 30 MIN: CPT | Performed by: INTERNAL MEDICINE

## 2020-02-20 PROCEDURE — 4040F PNEUMOC VAC/ADMIN/RCVD: CPT | Performed by: INTERNAL MEDICINE

## 2020-02-20 PROCEDURE — 1036F TOBACCO NON-USER: CPT | Performed by: INTERNAL MEDICINE

## 2020-02-20 NOTE — LETTER
[address] Cardiology [address] [facility]  Phone: [phone]  Fax: [phone]    Kathia Ngo MD        February 21, 2020     Lisa Mcfadden MD  [address] [facility] [address] [facility] [address] [address]    Patient: Klaudia Zepeda  MR Number: [id number]  YOB: 1934  Date of Visit: 2/20/2020    Dear Dr. Gonzalez Signs:    [address]   Cardiac Evaluation      Patient: Klaudia Zepeda  YOB: 1934  Date: 2/20/20     Chief Complaint   Patient presents with    Coronary Artery Disease    Hyperlipidemia    Hypertension      Referring provider: Lisa Mcfadden MD    History of Present Illness:   Mr. Mag Wallace comes to the office today for follow up of his coronary disease, hypertension, hyperlipidemia. He has a history of coronary disease with CABG in 2005. He had a nuclear stress March, 2013. He had a pacemaker placed 11/14 at [facility] after a syncopal episode with HR 30's. He had been following with both me and [facility] cardiologists for pacemaker checks and office visits. He is now just following with me. Today, Mr. Mag Wallace states he has been ok. He denies any chest pain, palpitations, SANDHU, dizziness, or edema. Mr Mag Wallace stays active and has coffee visits with his friends. He does not exercise routinely. Past Medical History:   has a past medical history of Asthma, CAD (coronary artery disease), and Hyperlipidemia. Surgical History:   has a past surgical history that includes Coronary artery bypass graft. Social History:   reports that he has never smoked. He has never used smokeless tobacco. He reports that he does not drink alcohol or use drugs. Family History:  family history includes Heart Disease in his father and mother. Allergies:  Gabapentin     Review of Systems:   · Constitutional: there has been no unanticipated weight loss.  No change in energy or activity level   · Eyes: No visual changes

## 2020-04-09 ENCOUNTER — NURSE ONLY (OUTPATIENT)
Dept: CARDIOLOGY CLINIC | Age: 85
End: 2020-04-09
Payer: MEDICARE

## 2020-04-09 PROCEDURE — 93280 PM DEVICE PROGR EVAL DUAL: CPT | Performed by: INTERNAL MEDICINE

## 2020-07-02 ENCOUNTER — NURSE ONLY (OUTPATIENT)
Dept: CARDIOLOGY CLINIC | Age: 85
End: 2020-07-02
Payer: MEDICARE

## 2020-07-02 PROCEDURE — 93280 PM DEVICE PROGR EVAL DUAL: CPT | Performed by: INTERNAL MEDICINE

## 2020-07-02 NOTE — PROGRESS NOTES
Patient comes in for programming evaluation for his pacemaker. All sensing and pacing parameters are within normal range. Noted NSVT. Pt is on Lopressor. No changes need to be made at this time. Please see interrogation for more detail. Patient will follow up in 3 months in office or remotely.

## 2020-08-31 ENCOUNTER — TELEPHONE (OUTPATIENT)
Dept: CARDIOLOGY CLINIC | Age: 85
End: 2020-08-31

## 2020-09-03 ENCOUNTER — OFFICE VISIT (OUTPATIENT)
Dept: CARDIOLOGY CLINIC | Age: 85
End: 2020-09-03
Payer: MEDICARE

## 2020-09-03 VITALS
HEART RATE: 60 BPM | BODY MASS INDEX: 26.22 KG/M2 | OXYGEN SATURATION: 97 % | WEIGHT: 173 LBS | SYSTOLIC BLOOD PRESSURE: 124 MMHG | DIASTOLIC BLOOD PRESSURE: 60 MMHG | HEIGHT: 68 IN

## 2020-09-03 PROCEDURE — G8417 CALC BMI ABV UP PARAM F/U: HCPCS | Performed by: INTERNAL MEDICINE

## 2020-09-03 PROCEDURE — 1036F TOBACCO NON-USER: CPT | Performed by: INTERNAL MEDICINE

## 2020-09-03 PROCEDURE — 99214 OFFICE O/P EST MOD 30 MIN: CPT | Performed by: INTERNAL MEDICINE

## 2020-09-03 PROCEDURE — 1123F ACP DISCUSS/DSCN MKR DOCD: CPT | Performed by: INTERNAL MEDICINE

## 2020-09-03 PROCEDURE — 4040F PNEUMOC VAC/ADMIN/RCVD: CPT | Performed by: INTERNAL MEDICINE

## 2020-09-03 PROCEDURE — G8427 DOCREV CUR MEDS BY ELIG CLIN: HCPCS | Performed by: INTERNAL MEDICINE

## 2020-09-03 NOTE — PROGRESS NOTES
mentation, behavior. · Psychiatric: No anxiety or depression  · Endocrine: No malaise or fever  · Hematologic/Lymphatic: No abnormal bruising or bleeding, blood clots or swollen lymph nodes. · Allergic/Immunologic: No nasal congestion or hives. Physical Examination:    Vitals:    09/03/20 1059   BP: 124/60   Site: Left Upper Arm   Position: Sitting   Cuff Size: Medium Adult   Pulse: 60   SpO2: 97%   Weight: 173 lb (78.5 kg)   Height: 5' 8\" (1.727 m)     Wt Readings from Last 3 Encounters:   09/03/20 173 lb (78.5 kg)   02/20/20 178 lb (80.7 kg)   09/05/19 169 lb (76.7 kg)     BP Readings from Last 3 Encounters:   09/03/20 124/60   02/20/20 130/60   09/05/19 124/60     Constitutional and General Appearance:  appears stated age  Respiratory:  · Normal excursion and expansion without use of accessory muscles  · Resp Auscultation: Normal breath sounds without dullness  Cardiovascular:  · The apical impulses not displaced  · Heart is regular rate and rhythm with normal S1, S2  · The carotid upstroke is normal, no bruit noted   · JVP is not elevated  · Peripheral pulses are symmetrical  · There is no clubbing, cyanosis of the extremities  · No edema  · Femoral Arteries: 2+ and equal  · Pedal Pulses: 2+ and equal   Abdomen:  · No masses or tenderness  · Normal bowel sounds  Neurological/Psychiatric:  · Alert and oriented x3  · Moves all extremities well  · Exhibits normal gait balance and coordination    Assessment:  1. Coronary  artery disease: Stable, no anginal symptoms  CABG 6/3/05> LIMA-LAD, SVG-diagonal and OM, SVG-posterior ventricular and posterior descending branches of RC system  --GXT Edvin 11/14> ([address]) Abnormal myocardial perfusion study with a large severe perfusion abnormality inferior wall reflecting myocardial scar. EF 48%  --Echo 11/14> ([address] [facility])  EF 45-50%, Aortic valve: Valve area: 2.05cm^2 (Vmax).  Left atrium: The  atrium was mildly dilated  --GXT Edvin 3/13> No diagnostic EKG evidence for stress induced ischemia, very frequent PVC's and couplets. Moderate area of decreased perfusion in inferior wall with stress and rest consistent with scar, no evidence for reversible ischemia. EF 45%  --GXT Edvin 7/18/07> small to moderate sized inferolateral fixed defect consistent with infarction   2. Essential hypertension, benign: Stable   3. Other and unspecified hyperlipidemia: labs are stable 2/17/20: ; TRIG 181; HDL 35; LDL 74, stopped Lipitor because he had leg cramps, advised to restart at last visit   4. Carotid stenosis: Stable  Carotid doppler 3/17> 1-15% bilateral  Carotid doppler 3/27/12> 16-49% bilateral stenosis  Carotid doppler 2/10> mild (<40%) bilaterally   5. Pacemaker, [employer]: placed at [facility] 11/2014 by Dr. Favian Salinas. PPM checked regularly in my office. Not compatible for MRI. Plan: Thank you for allowing to me to participate in the care of Kae Bruno. Scribe's attestation: This note was scribed in the presence of Dr Janette Killian MD by Julio Dutton, JESUS.

## 2020-09-03 NOTE — LETTER
[address] Cardiology [address] [facility]   Phone: [phone]  Fax: [phone]    Steve Rodriguez MD        September 4, 2020     Abril Orozco MD  [address] [id number] [facility] [address] [address]    Patient: Wally Goldstein  MR Number: [id number]  YOB: 1934  Date of Visit: 9/3/2020    Dear Dr. Parnell:    [facility], [place]   Cardiac Evaluation      Patient: Wally Goldstein  YOB: 1934  Date: 9/3/20     Chief Complaint   Patient presents with    Coronary Artery Disease    Hypertension      Referring provider: Abril Orozco MD    History of Present Illness:   Mr. Winston Jorgensen comes to the office today for follow up of his coronary disease, hypertension, hyperlipidemia. He has a history of coronary disease with CABG in 2005. He had a nuclear stress March, 2013. He had a pacemaker placed 11/14 at [facility] after a syncopal episode with HR 30's. He had been following with both me and [facility] cardiologists for pacemaker checks and office visits. He is now just following with me. Today, Mr. Winston Jorgensen     Past Medical History:   has a past medical history of Asthma, CAD (coronary artery disease), and Hyperlipidemia. Surgical History:   has a past surgical history that includes Coronary artery bypass graft. Social History:   reports that he has never smoked. He has never used smokeless tobacco. He reports that he does not drink alcohol or use drugs. Family History:  family history includes Heart Disease in his father and mother. Allergies:  Gabapentin     Review of Systems:   · Constitutional: there has been no unanticipated weight loss. No change in energy or activity level   · Eyes: No visual changes   · ENT: No Headaches, hearing loss or vertigo. No mouth sores or sore throat. · Cardiovascular: Reviewed in HPI  · Respiratory: No cough or wheezing, no sputum production. No hematemesis. CABG 6/3/05> LIMA-LAD, SVG-diagonal and OM, SVG-posterior ventricular and posterior descending branches of RC system  --GXT Edvin 11/14> ([facility]) Abnormal myocardial perfusion study with a large severe perfusion abnormality inferior wall reflecting myocardial scar. EF 48%  --Echo 11/14> ([facility])  EF 45-50%, Aortic valve: Valve area: 2.05cm^2 (Vmax). Left atrium: The  atrium was mildly dilated  --GXT Edvin 3/13> No diagnostic EKG evidence for stress induced ischemia, very frequent PVC's and couplets. Moderate area of decreased perfusion in inferior wall with stress and rest consistent with scar, no evidence for reversible ischemia. EF 45%  --GXT Edvin 7/18/07> small to moderate sized inferolateral fixed defect consistent with infarction   2. Essential hypertension, benign: Stable   3. Other and unspecified hyperlipidemia: labs are stable 2/17/20: ; TRIG 181; HDL 35; LDL 74, stopped Lipitor because he had leg cramps, advised to restart at last visit   4. Carotid stenosis: Stable  Carotid doppler 3/17> 1-15% bilateral  Carotid doppler 3/27/12> 16-49% bilateral stenosis  Carotid doppler 2/10> mild (<40%) bilaterally   5. Pacemaker, [place] [employer]: placed at [facility] 11/2014 by Dr. Favian Salinas. PPM checked regularly in my office. Not compatible for MRI. Plan: Thank you for allowing to me to participate in the care of Kae Bruno. Scribe's attestation: This note was scribed in the presence of Dr Janette Killian MD by Julio Dutton, JESUS. [facility]   Cardiac Evaluation      Patient: Kae Bruno  YOB: 1934  Date: 9/3/20     Chief Complaint   Patient presents with    Coronary Artery Disease    Hypertension      Referring provider: Jonn Wesley MD    History of Present Illness:   Mr. Yasmeen Silveira comes to the office today for follow up of his coronary disease, hypertension, hyperlipidemia.  He has a history of coronary disease with CABG in 2005. He had a nuclear stress March, 2013. He had a pacemaker placed 11/14 at [facility] after a syncopal episode with HR 30's. He had been following with both me and [facility] cardiologists for pacemaker checks and office visits. He is now just following with me. Today, Mr. Yanci Young states he has been ok. He rides a stationary bike 5 minutes twice a day. [employer] works in his yard and mows the lawn with a push mower. He denies any chest pain, palpitations, SANDHU, dizziness, or edema. His only complaint is trouble sleeping. Past Medical History:   has a past medical history of Asthma, CAD (coronary artery disease), and Hyperlipidemia. Surgical History:   has a past surgical history that includes Coronary artery bypass graft. Social History:   reports that he has never smoked. He has never used smokeless tobacco. He reports that he does not drink alcohol or use drugs. Family History:  family history includes Heart Disease in his father and mother. Allergies:  Gabapentin     Review of Systems:   · Constitutional: there has been no unanticipated weight loss. No change in energy or activity level   · Eyes: No visual changes   · ENT: No Headaches, hearing loss or vertigo. No mouth sores or sore throat. · Cardiovascular: Reviewed in HPI  · Respiratory: No cough or wheezing, no sputum production. No hematemesis. · Gastrointestinal: No abdominal pain, appetite loss, blood in stools. No change in bowel or bladder habits. · Genitourinary: No nocturia, dysuria, trouble voiding  · Musculoskeletal:  No gait disturbance, weakness or joint complaints. · Integumentary: No rash or pruritis. · Neurological: No headache, change in muscle strength, numbness or tingling. No change in gait, balance, coordination, mood, affect, memory, mentation, behavior.   · Psychiatric: No anxiety or depression  · Endocrine: No malaise or fever  · Hematologic/Lymphatic: No abnormal bruising or bleeding, blood clots or swollen lymph nodes. · Allergic/Immunologic: No nasal congestion or hives. Physical Examination:    Vitals:    09/03/20 1059   BP: 124/60   Site: Left Upper Arm   Position: Sitting   Cuff Size: Medium Adult   Pulse: 60   SpO2: 97%   Weight: 173 lb (78.5 kg)   Height: 5' 8\" (1.727 m)     Wt Readings from Last 3 Encounters:   09/03/20 173 lb (78.5 kg)   02/20/20 178 lb (80.7 kg)   09/05/19 169 lb (76.7 kg)     BP Readings from Last 3 Encounters:   09/03/20 124/60   02/20/20 130/60   09/05/19 124/60     Constitutional and General Appearance:  appears stated age  Respiratory:  · Normal excursion and expansion without use of accessory muscles  · Resp Auscultation: Normal breath sounds without dullness  Cardiovascular:  · The apical impulses not displaced  · Heart is regular rate and rhythm with normal S1, S2  · The carotid upstroke is normal, no bruit noted   · JVP is not elevated  · Peripheral pulses are symmetrical  · There is no clubbing, cyanosis of the extremities  · No edema  · Femoral Arteries: 2+ and equal  · Pedal Pulses: 2+ and equal   Abdomen:  · No masses or tenderness  · Normal bowel sounds  Neurological/Psychiatric:  · Alert and oriented x3  · Moves all extremities well  · Exhibits normal gait balance and coordination    Assessment:  1. Coronary  artery disease: Stable, no anginal symptoms  CABG 6/3/05> LIMA-LAD, SVG-diagonal and OM, SVG-posterior ventricular and posterior descending branches of RC system  --GXT Edvin 11/14> ([facility]) Abnormal myocardial perfusion study with a large severe perfusion abnormality inferior wall reflecting myocardial scar. EF 48%  --Echo 11/14> ([facility])  EF 45-50%, Aortic valve: Valve area: 2.05cm^2 (Vmax). Left atrium: The  atrium was mildly dilated  --GXT Edvin 3/13> No diagnostic EKG evidence for stress induced ischemia, very frequent PVC's and couplets.  Moderate area of decreased perfusion in inferior wall with stress and rest consistent with scar, no evidence for reversible ischemia. EF 45%  --GXT Edvin 7/18/07> small to moderate sized inferolateral fixed defect consistent with infarction   2. Essential hypertension, benign: Stable   3. Other and unspecified hyperlipidemia: labs are stable 9/1/20: ; TRIG 209; HDL 35; LDL 72, stopped Lipitor because he had leg cramps, advised to restart at last visit   4. Carotid stenosis: Stable  Carotid doppler 3/17> 1-15% bilateral  Carotid doppler 3/27/12> 16-49% bilateral stenosis  Carotid doppler 2/10> mild (<40%) bilaterally   5. Pacemaker, [place] [employer]: placed at [facility] 11/2014 by Dr. Lelan Fothergill. PPM checked regularly in my office. Not compatible for MRI      Plan:  Mr Winston Jorgensen appears to be stable. No med changes. Discussed him increasing the amount of time on his stationary bike. FU 6 months. Thank you for allowing to me to participate in the care of Wally Goldstein. Scribe's attestation: This note was scribed in the presence of Dr Kingston Montanez MD by Shaun Rodriguez RN. The scribe's documentation has been prepared under my direction and personally reviewed by me in its entirety. I confirm that the note above accurately reflects all work, treatment, procedures, and medical decision making performed by me. If you have questions, please do not hesitate to call me. I look forward to following Mia Valel along with you.     Sincerely,        Steve Rodriguez MD

## 2020-09-03 NOTE — PROGRESS NOTES
[facility]   Cardiac Evaluation      Patient: William Jessica  YOB: 1934  Date: 9/3/20     Chief Complaint   Patient presents with    Coronary Artery Disease    Hypertension      Referring provider: Urban Aceves MD    History of Present Illness:   Mr. Jazmin Truong comes to the office today for follow up of his coronary disease, hypertension, hyperlipidemia. He has a history of coronary disease with CABG in 2005. He had a nuclear stress March, 2013. He had a pacemaker placed 11/14 at [facility] after a syncopal episode with HR 30's. He had been following with both me and [facility] cardiologists for pacemaker checks and office visits. He is now just following with me. Today, Mr. Jazmin Truong states he has been ok. He rides a stationary bike 5 minutes twice a day. Jl Bell works in his yard and mows the lawn with a push mower. He denies any chest pain, palpitations, SANDHU, dizziness, or edema. His only complaint is trouble sleeping. Past Medical History:   has a past medical history of Asthma, CAD (coronary artery disease), and Hyperlipidemia. Surgical History:   has a past surgical history that includes Coronary artery bypass graft. Social History:   reports that he has never smoked. He has never used smokeless tobacco. He reports that he does not drink alcohol or use drugs. Family History:  family history includes Heart Disease in his father and mother. Allergies:  Gabapentin     Review of Systems:   · Constitutional: there has been no unanticipated weight loss. No change in energy or activity level   · Eyes: No visual changes   · ENT: No Headaches, hearing loss or vertigo. No mouth sores or sore throat. · Cardiovascular: Reviewed in HPI  · Respiratory: No cough or wheezing, no sputum production. No hematemesis. · Gastrointestinal: No abdominal pain, appetite loss, blood in stools. No change in bowel or bladder habits.   · Genitourinary: No nocturia, dysuria, trouble voiding  · Musculoskeletal:  No gait disturbance, weakness or joint complaints. · Integumentary: No rash or pruritis. · Neurological: No headache, change in muscle strength, numbness or tingling. No change in gait, balance, coordination, mood, affect, memory, mentation, behavior. · Psychiatric: No anxiety or depression  · Endocrine: No malaise or fever  · Hematologic/Lymphatic: No abnormal bruising or bleeding, blood clots or swollen lymph nodes. · Allergic/Immunologic: No nasal congestion or hives. Physical Examination:    Vitals:    09/03/20 1059   BP: 124/60   Site: Left Upper Arm   Position: Sitting   Cuff Size: Medium Adult   Pulse: 60   SpO2: 97%   Weight: 173 lb (78.5 kg)   Height: 5' 8\" (1.727 m)     Wt Readings from Last 3 Encounters:   09/03/20 173 lb (78.5 kg)   02/20/20 178 lb (80.7 kg)   09/05/19 169 lb (76.7 kg)     BP Readings from Last 3 Encounters:   09/03/20 124/60   02/20/20 130/60   09/05/19 124/60     Constitutional and General Appearance:  appears stated age  Respiratory:  · Normal excursion and expansion without use of accessory muscles  · Resp Auscultation: Normal breath sounds without dullness  Cardiovascular:  · The apical impulses not displaced  · Heart is regular rate and rhythm with normal S1, S2  · The carotid upstroke is normal, no bruit noted   · JVP is not elevated  · Peripheral pulses are symmetrical  · There is no clubbing, cyanosis of the extremities  · No edema  · Femoral Arteries: 2+ and equal  · Pedal Pulses: 2+ and equal   Abdomen:  · No masses or tenderness  · Normal bowel sounds  Neurological/Psychiatric:  · Alert and oriented x3  · Moves all extremities well  · Exhibits normal gait balance and coordination    Assessment:  1.  Coronary  artery disease: Stable, no anginal symptoms  CABG 6/3/05> LIMA-LAD, SVG-diagonal and OM, SVG-posterior ventricular and posterior descending branches of RC system  --GXT Edvin 11/14> ([address]) Abnormal myocardial perfusion study with a large severe perfusion abnormality inferior wall reflecting myocardial scar. EF 48%  --[facility]/14> [facility]  EF 45-50%, Aortic valve: Valve area: 2.05cm^2 (Vmax). Left atrium: The  atrium was mildly dilated  --GXT Edvin 3/13> No diagnostic EKG evidence for stress induced ischemia, very frequent PVC's and couplets. Moderate area of decreased perfusion in inferior wall with stress and rest consistent with scar, no evidence for reversible ischemia. EF 45%  --GXT Edvin 7/18/07> small to moderate sized inferolateral fixed defect consistent with infarction   2. Essential hypertension, benign: Stable   3. Other and unspecified hyperlipidemia: labs are stable 9/1/20: ; TRIG 209; HDL 35; LDL 72, stopped Lipitor because he had leg cramps, advised to restart at last visit   4. Carotid stenosis: Stable  Carotid doppler 3/17> 1-15% bilateral  Carotid doppler 3/27/12> 16-49% bilateral stenosis  Carotid doppler 2/10> mild (<40%) bilaterally   5. Pacemaker, [place] [employer]: placed at [facility] 11/2014 by Dr. Rashad Miller. PPM checked regularly in my office. Not compatible for MRI      Plan:  Mr Hardeep Blank appears to be stable. No med changes. Discussed him increasing the amount of time on his stationary bike. FU 6 months. Thank you for allowing to me to participate in the care of Yasmin Monique. Scribe's attestation: This note was scribed in the presence of Dr Andrés Scruggs MD by Jose Durant RN. The scribe's documentation has been prepared under my direction and personally reviewed by me in its entirety. I confirm that the note above accurately reflects all work, treatment, procedures, and medical decision making performed by me.

## 2020-10-08 ENCOUNTER — NURSE ONLY (OUTPATIENT)
Dept: CARDIOLOGY CLINIC | Age: 85
End: 2020-10-08
Payer: MEDICARE

## 2020-10-08 PROCEDURE — 93280 PM DEVICE PROGR EVAL DUAL: CPT | Performed by: INTERNAL MEDICINE

## 2020-10-08 NOTE — PROGRESS NOTES
Patient comes in for programming evaluation for hisw pacemaker. All sensing and pacing parameters are within normal range. Noted NSVT. Pt is on Lopressor. No changes need to be made at this time. Please see interrogation for more detail. Patient will follow up in 3 months in office or remotely.

## 2020-11-23 ENCOUNTER — TELEPHONE (OUTPATIENT)
Dept: CARDIOLOGY CLINIC | Age: 85
End: 2020-11-23

## 2020-11-23 NOTE — TELEPHONE ENCOUNTER
Patient's daughter called stating he passed out Saturday and is at [facility]. She states he tested positive for Covid and she was told Dr Chantal Can needs to call the hospitalist to discuss discharging him to adjust meds due to orthostatic blood pressures and having a PPM. She states they told her they cannot do anything because he has a PPM. Per Dr Sumi Mcmullen should have a device rep check his PPM the hospitalist can call at any time to discuss his care,  and we can see him after he is discharged and clear from Covid. I relayed this to pt's daughter who verbalized understanding.

## 2020-12-07 ENCOUNTER — TELEPHONE (OUTPATIENT)
Dept: CARDIOLOGY CLINIC | Age: 85
End: 2020-12-07

## 2020-12-07 NOTE — TELEPHONE ENCOUNTER
Daughter wanted Dr. Lauren Leary to know that he has been falling a lot lately. Pt may not mention this during his visit. It is typically happening when he first stands up.

## 2020-12-07 NOTE — TELEPHONE ENCOUNTER
Pt daughter calling pt has an appt with [address] on 12/10. He tested positive for COVID-19 on 10/21/2020 has since been cleared, but did not have another test taken. Does [facility] still want to see pt?  Pls call to advise Thank you

## 2020-12-10 ENCOUNTER — OFFICE VISIT (OUTPATIENT)
Dept: CARDIOLOGY CLINIC | Age: 85
End: 2020-12-10
Payer: MEDICARE

## 2020-12-10 VITALS
BODY MASS INDEX: 25.46 KG/M2 | OXYGEN SATURATION: 97 % | DIASTOLIC BLOOD PRESSURE: 70 MMHG | HEART RATE: 60 BPM | SYSTOLIC BLOOD PRESSURE: 126 MMHG | HEIGHT: 68 IN | WEIGHT: 168 LBS

## 2020-12-10 PROBLEM — R55 SYNCOPE: Status: ACTIVE | Noted: 2020-12-10

## 2020-12-10 PROCEDURE — G8484 FLU IMMUNIZE NO ADMIN: HCPCS | Performed by: INTERNAL MEDICINE

## 2020-12-10 PROCEDURE — 99214 OFFICE O/P EST MOD 30 MIN: CPT | Performed by: INTERNAL MEDICINE

## 2020-12-10 PROCEDURE — 1123F ACP DISCUSS/DSCN MKR DOCD: CPT | Performed by: INTERNAL MEDICINE

## 2020-12-10 PROCEDURE — G8427 DOCREV CUR MEDS BY ELIG CLIN: HCPCS | Performed by: INTERNAL MEDICINE

## 2020-12-10 PROCEDURE — 1036F TOBACCO NON-USER: CPT | Performed by: INTERNAL MEDICINE

## 2020-12-10 PROCEDURE — 4040F PNEUMOC VAC/ADMIN/RCVD: CPT | Performed by: INTERNAL MEDICINE

## 2020-12-10 PROCEDURE — G8417 CALC BMI ABV UP PARAM F/U: HCPCS | Performed by: INTERNAL MEDICINE

## 2020-12-10 RX ORDER — MULTIVIT WITH MINERALS/LUTEIN
250 TABLET ORAL DAILY
COMMUNITY

## 2020-12-10 RX ORDER — METOPROLOL SUCCINATE 50 MG/1
50 TABLET, EXTENDED RELEASE ORAL 2 TIMES DAILY
COMMUNITY
End: 2021-12-28 | Stop reason: SDUPTHER

## 2020-12-10 NOTE — PROGRESS NOTES
[address]   Cardiac Evaluation      Patient: Jason Gray  YOB: 1934  Date: 12/10/20     Chief Complaint   Patient presents with    Coronary Artery Disease    Hypertension      Referring provider: Rennis Cushing, MD    History of Present Illness:   Mr. Abimbola Tyler comes to the office today for follow up of his coronary disease, hypertension, hyperlipidemia. He has a history of coronary disease with CABG in 2005. He had a nuclear stress March, 2013. He had a pacemaker placed 11/14 at [facility] after a syncopal episode with HR 30's. He had been following with both me and [facility] cardiologists for pacemaker checks and office visits. He is now just following with me. Mr Abimbola Tyler was recently hospitalized at [facility] w/ ? Syncopal episode. He states he was changing paper towel roll at home and felt lightheaded and dizzy. He states he fell back into his bathtub. His wife called 46 and he was taken to [facility]. He was orthostatic and given IV fluids. He tested + for Covid, as well. Mr Abimbola Tyler was advised to wear compression hose, which he is doing now. Lisinopril was discontinued and Metoprolol changed to Toprol XL. He states he naps during the day because there is nothing to do at home. He states he still feels lightheaded most of the time. He walks for exercise and feels ok with this. Xu Howell rides a stationary bike <5 minutes twice a day. Past Medical History:   has a past medical history of Asthma, CAD (coronary artery disease), and Hyperlipidemia. Surgical History:   has a past surgical history that includes Coronary artery bypass graft. Social History:   reports that he has never smoked. He has never used smokeless tobacco. He reports that he does not drink alcohol or use drugs. Family History:  family history includes Heart Disease in his father and mother. Allergies:  Gabapentin     Review of Systems:   · Constitutional: there has been no unanticipated weight loss.  No change in energy or activity level   · Eyes: No visual changes   · ENT: No Headaches, hearing loss or vertigo. No mouth sores or sore throat. · Cardiovascular: Reviewed in HPI  · Respiratory: No cough or wheezing, no sputum production. No hematemesis. · Gastrointestinal: No abdominal pain, appetite loss, blood in stools. No change in bowel or bladder habits. · Genitourinary: No nocturia, dysuria, trouble voiding  · Musculoskeletal:  No gait disturbance, weakness or joint complaints. · Integumentary: No rash or pruritis. · Neurological: No headache, change in muscle strength, numbness or tingling. No change in gait, balance, coordination, mood, affect, memory, mentation, behavior. · Psychiatric: No anxiety or depression  · Endocrine: No malaise or fever  · Hematologic/Lymphatic: No abnormal bruising or bleeding, blood clots or swollen lymph nodes. · Allergic/Immunologic: No nasal congestion or hives.     Physical Examination:    Vitals:    12/10/20 1438 12/10/20 1444   BP: 124/70 126/70   Site: Right Upper Arm Right Upper Arm   Position: Sitting Sitting   Cuff Size: Medium Adult Medium Adult   Pulse: 60    SpO2: 97%    Weight: 168 lb (76.2 kg)    Height: 5' 8\" (1.727 m)      Wt Readings from Last 3 Encounters:   12/10/20 168 lb (76.2 kg)   09/03/20 173 lb (78.5 kg)   02/20/20 178 lb (80.7 kg)     BP Readings from Last 3 Encounters:   12/10/20 126/70   09/03/20 124/60   02/20/20 130/60     Constitutional and General Appearance:  appears stated age  Respiratory:  · Normal excursion and expansion without use of accessory muscles  · Resp Auscultation: Normal breath sounds without dullness  Cardiovascular:  · The apical impulses not displaced  · Heart is regular rate and rhythm with normal S1, S2  · The carotid upstroke is normal, no bruit noted   · JVP is not elevated  · Peripheral pulses are symmetrical  · There is no clubbing, cyanosis of the extremities  · No edema  · Femoral Arteries: 2+ and equal  · Pedal Pulses: 2+ and equal Abdomen:  · No masses or tenderness  · Normal bowel sounds  Neurological/Psychiatric:  · Alert and oriented x3  · Moves all extremities well  · Exhibits normal gait balance and coordination    Assessment:  1. Coronary  artery disease: Stable, no anginal symptoms  CABG 6/3/05> LIMA-LAD, SVG-diagonal and OM, SVG-posterior ventricular and posterior descending branches of RC system  --GXT Edvin 11/14> ([facility] Abnormal myocardial perfusion study with a large severe perfusion abnormality inferior wall reflecting myocardial scar. EF 48%  --Echo 11/14> ([facility]  EF 45-50%, Aortic valve: Valve area: 2.05cm^2 (Vmax). Left atrium: The  atrium was mildly dilated  --GXT Edvin 3/13> No diagnostic EKG evidence for stress induced ischemia, very frequent PVC's and couplets. Moderate area of decreased perfusion in inferior wall with stress and rest consistent with scar, no evidence for reversible ischemia. EF 45%  --GXT Edvin 7/18/07> small to moderate sized inferolateral fixed defect consistent with infarction   2. Orthostatic hypotension: 160/80 lying down, 144/80 sitting, 144/80 standing   3. Other and unspecified hyperlipidemia: labs are stable 9/1/20: ; TRIG 209; HDL 35; LDL 72, stopped Lipitor because he had leg cramps, but is back to taking daily   4. Carotid stenosis: Stable  Carotid doppler 3/17> 1-15% bilateral  Carotid doppler 3/27/12> 16-49% bilateral stenosis  Carotid doppler 2/10> mild (<40%) bilaterally   5. Pacemaker, [place] [employer]: [facility] by Dr. Kassie Cade. PPM checked regularly in my office. Not compatible for MRI      Plan:  Stay off Lisinopril. Continue to wear compression hose daily. Recommend increasing riding stationary bike 3 times per day and work up to 10 minutes at a time. FU 3 months. Thank you for allowing to me to participate in the care of Lelo Loja. Scribe's attestation: This note was scribed in the presence of Dr Jessika Mills MD by Barb Calloway RN. The scribe's documentation has been prepared under my direction and personally reviewed by me in its entirety. I confirm that the note above accurately reflects all work, treatment, procedures, and medical decision making performed by me.

## 2020-12-10 NOTE — LETTER
[address] [place] [address] [address]  Phone: [phone]  Fax: [phone]    Davian Rod MD        December 14, 2020     Yunier Connors MD  [id number] [facility] [address] [address]    Patient: Elizabeth Hernandez  MR Number: [id number]  YOB: 1934  Date of Visit: 12/10/2020    Dear Dr. Yunier Connors:    [address]   Cardiac Evaluation      Patient: Elizabeth Hernandez  YOB: 1934  Date: 12/10/20     Chief Complaint   Patient presents with    Coronary Artery Disease    Hypertension      Referring provider: Yunier Connors MD    History of Present Illness:   Mr. Jb Camara comes to the office today for follow up of his coronary disease, hypertension, hyperlipidemia. He has a history of coronary disease with CABG in 2005. He had a nuclear stress March, 2013. He had a pacemaker placed 11/14 at [facility] after a syncopal episode with HR 30's. He had been following with both me and [facility] cardiologists for pacemaker checks and office visits. He is now just following with me. Mr Jb Camara was recently hospitalized at [facility] w/ ? Syncopal episode. He states he was changing paper towel roll at home and felt lightheaded and dizzy. He states he fell back into his bathtub. His wife called 46 and he was taken to [facility]. He was orthostatic and given IV fluids. He tested + for Covid, as well. Mr Jb Camara was advised to wear compression hose, which he is doing now. Lisinopril was discontinued and Metoprolol changed to Toprol XL. He states he naps during the day because there is nothing to do at home. He states he still feels lightheaded most of the time. He walks for exercise and feels ok with this. Julio Cesar Hayward rides a stationary bike <5 minutes twice a day. Past Medical History:   has a past medical history of Asthma, CAD (coronary artery disease), and Hyperlipidemia.     Surgical History: has a past surgical history that includes Coronary artery bypass graft. Social History:   reports that he has never smoked. He has never used smokeless tobacco. He reports that he does not drink alcohol or use drugs. Family History:  family history includes Heart Disease in his father and mother. Allergies:  Gabapentin     Review of Systems:   · Constitutional: there has been no unanticipated weight loss. No change in energy or activity level   · Eyes: No visual changes   · ENT: No Headaches, hearing loss or vertigo. No mouth sores or sore throat. · Cardiovascular: Reviewed in HPI  · Respiratory: No cough or wheezing, no sputum production. No hematemesis. · Gastrointestinal: No abdominal pain, appetite loss, blood in stools. No change in bowel or bladder habits. · Genitourinary: No nocturia, dysuria, trouble voiding  · Musculoskeletal:  No gait disturbance, weakness or joint complaints. · Integumentary: No rash or pruritis. · Neurological: No headache, change in muscle strength, numbness or tingling. No change in gait, balance, coordination, mood, affect, memory, mentation, behavior. · Psychiatric: No anxiety or depression  · Endocrine: No malaise or fever  · Hematologic/Lymphatic: No abnormal bruising or bleeding, blood clots or swollen lymph nodes. · Allergic/Immunologic: No nasal congestion or hives.     Physical Examination:    Vitals:    12/10/20 1438 12/10/20 1444   BP: 124/70 126/70   Site: Right Upper Arm Right Upper Arm   Position: Sitting Sitting   Cuff Size: Medium Adult Medium Adult   Pulse: 60    SpO2: 97%    Weight: 168 lb (76.2 kg)    Height: 5' 8\" (1.727 m)      Wt Readings from Last 3 Encounters:   12/10/20 168 lb (76.2 kg)   09/03/20 173 lb (78.5 kg)   02/20/20 178 lb (80.7 kg)     BP Readings from Last 3 Encounters:   12/10/20 126/70   09/03/20 124/60   02/20/20 130/60     Constitutional and General Appearance:  appears stated age  Respiratory: · Normal excursion and expansion without use of accessory muscles  · Resp Auscultation: Normal breath sounds without dullness  Cardiovascular:  · The apical impulses not displaced  · Heart is regular rate and rhythm with normal S1, S2  · The carotid upstroke is normal, no bruit noted   · JVP is not elevated  · Peripheral pulses are symmetrical  · There is no clubbing, cyanosis of the extremities  · No edema  · Femoral Arteries: 2+ and equal  · Pedal Pulses: 2+ and equal   Abdomen:  · No masses or tenderness  · Normal bowel sounds  Neurological/Psychiatric:  · Alert and oriented x3  · Moves all extremities well  · Exhibits normal gait balance and coordination    Assessment:  1. Coronary  artery disease: Stable, no anginal symptoms  CABG 6/3/05> LIMA-LAD, SVG-diagonal and OM, SVG-posterior ventricular and posterior descending branches of RC system  --[facility] Abnormal myocardial perfusion study with a large severe perfusion abnormality inferior wall reflecting myocardial scar. EF 48%  --Echo 11/14> ([address])  EF 45-50%, Aortic valve: Valve area: 2.05cm^2 (Vmax). Left atrium: The  atrium was mildly dilated  --GXT Edvin 3/13> No diagnostic EKG evidence for stress induced ischemia, very frequent PVC's and couplets. Moderate area of decreased perfusion in inferior wall with stress and rest consistent with scar, no evidence for reversible ischemia. EF 45%  --GXT Edvin 7/18/07> small to moderate sized inferolateral fixed defect consistent with infarction   2. Orthostatic hypotension: 160/80 lying down, 144/80 sitting, 144/80 standing   3. Other and unspecified hyperlipidemia: labs are stable 9/1/20: ; TRIG 209; HDL 35; LDL 72, stopped Lipitor because he had leg cramps, but is back to taking daily   4.  Carotid stenosis: Stable  Carotid doppler 3/17> 1-15% bilateral  Carotid doppler 3/27/12> 16-49% bilateral stenosis  Carotid doppler 2/10> mild (<40%) bilaterally 5.  Pacemaker, [employer]: placed at [facility] 11/2014 by Dr. Lyric Tejada. PPM checked regularly in my office. Not compatible for MRI      Plan:  Stay off Lisinopril. Continue to wear compression hose daily. Recommend increasing riding stationary bike 3 times per day and work up to 10 minutes at a time. FU 3 months. Thank you for allowing to me to participate in the care of Kraig Smith Scribe's attestation: This note was scribed in the presence of Dr Jacky Mack MD by Carroll Birmingham RN. The scribe's documentation has been prepared under my direction and personally reviewed by me in its entirety. I confirm that the note above accurately reflects all work, treatment, procedures, and medical decision making performed by me. If you have questions, please do not hesitate to call me. I look forward to following Milagro Zamora along with you.     Sincerely,        Jason Hummel MD

## 2021-01-14 ENCOUNTER — NURSE ONLY (OUTPATIENT)
Dept: CARDIOLOGY CLINIC | Age: 86
End: 2021-01-14
Payer: MEDICARE

## 2021-01-14 DIAGNOSIS — I49.5 SINOATRIAL NODE DYSFUNCTION (HCC): ICD-10-CM

## 2021-01-14 DIAGNOSIS — Z95.0 PACEMAKER: ICD-10-CM

## 2021-01-14 PROCEDURE — 93280 PM DEVICE PROGR EVAL DUAL: CPT | Performed by: INTERNAL MEDICINE

## 2021-01-14 NOTE — PROGRESS NOTES
Patient comes in for programming evaluation for his pacemaker. All sensing and pacing parameters are within normal range. Noted NSVT. Pt is on Toprol. No changes need to be made at this time. Please see interrogation for more detail. Patient will follow up in 3 months in office or remotely.

## 2021-03-01 DIAGNOSIS — E78.49 OTHER HYPERLIPIDEMIA: Primary | ICD-10-CM

## 2021-03-03 ENCOUNTER — OFFICE VISIT (OUTPATIENT)
Dept: CARDIOLOGY CLINIC | Age: 86
End: 2021-03-03
Payer: MEDICARE

## 2021-03-03 VITALS
HEART RATE: 61 BPM | BODY MASS INDEX: 26.52 KG/M2 | HEIGHT: 68 IN | SYSTOLIC BLOOD PRESSURE: 120 MMHG | WEIGHT: 175 LBS | DIASTOLIC BLOOD PRESSURE: 66 MMHG | OXYGEN SATURATION: 97 %

## 2021-03-03 DIAGNOSIS — I25.10 ATHEROSCLEROSIS OF NATIVE CORONARY ARTERY OF NATIVE HEART WITHOUT ANGINA PECTORIS: Primary | ICD-10-CM

## 2021-03-03 DIAGNOSIS — E78.49 OTHER HYPERLIPIDEMIA: ICD-10-CM

## 2021-03-03 DIAGNOSIS — I10 ESSENTIAL HYPERTENSION, BENIGN: ICD-10-CM

## 2021-03-03 DIAGNOSIS — Z95.0 PACEMAKER: ICD-10-CM

## 2021-03-03 PROCEDURE — 99214 OFFICE O/P EST MOD 30 MIN: CPT | Performed by: INTERNAL MEDICINE

## 2021-03-03 PROCEDURE — 4040F PNEUMOC VAC/ADMIN/RCVD: CPT | Performed by: INTERNAL MEDICINE

## 2021-03-03 PROCEDURE — G8484 FLU IMMUNIZE NO ADMIN: HCPCS | Performed by: INTERNAL MEDICINE

## 2021-03-03 PROCEDURE — G8427 DOCREV CUR MEDS BY ELIG CLIN: HCPCS | Performed by: INTERNAL MEDICINE

## 2021-03-03 PROCEDURE — 1036F TOBACCO NON-USER: CPT | Performed by: INTERNAL MEDICINE

## 2021-03-03 PROCEDURE — 1123F ACP DISCUSS/DSCN MKR DOCD: CPT | Performed by: INTERNAL MEDICINE

## 2021-03-03 PROCEDURE — G8417 CALC BMI ABV UP PARAM F/U: HCPCS | Performed by: INTERNAL MEDICINE

## 2021-03-03 NOTE — PROGRESS NOTES
[address]   Cardiac Evaluation      Patient: Chelo Driscoll  YOB: 1934  Date: 3/3/21     Chief Complaint   Patient presents with    Hypertension    Coronary Artery Disease      Referring provider: Ca Linares MD    History of Present Illness:   Mr. Jonathan Smith comes to the office today for follow up of his coronary disease, hypertension, hyperlipidemia. He has a history of coronary disease with CABG in 2005. He had a nuclear stress March, 2013. He had a pacemaker placed 11/14 at [facility] after a syncopal episode with HR 30's. He had been following with both me and [place] [address] cardiologists for pacemaker checks and office visits. He is now just following with me. Mr Jonathan Smith was hospitalized 11/21/20 at [facility]. [facility] w/ ? Syncopal episode. He states he was changing paper towel roll at home and felt lightheaded and dizzy. He states he fell back into his bathtub. His wife called 46 and he was taken to [facility]. [facility]. He was orthostatic and given IV fluids. He tested + for Covid, as well. Mr Jonathan Smith was advised to wear compression hose, which he is doing now. Lisinopril was discontinued and Metoprolol changed to Toprol XL. Mr Jonathan Smith has been exercising, but not as much as he should. He rides stationary bike 3 times per day, 10 minutes each time. He states he continues to feel lightheaded. Hetal Armijo denies any chest pain, palpitations, shortness of breath, or edema. Past Medical History:   has a past medical history of Asthma, CAD (coronary artery disease), and Hyperlipidemia. Surgical History:   has a past surgical history that includes Coronary artery bypass graft. Social History:   reports that he has never smoked. He has never used smokeless tobacco. He reports that he does not drink alcohol or use drugs. Family History:  family history includes Heart Disease in his father and mother.      Allergies:  Gabapentin     Current Outpatient Medications on File Prior to Visit   Medication Sig Dispense Refill    148; LDL 82 Lipitor   4. Carotid stenosis: Stable  Carotid doppler 3/17> 1-15% bilateral  Carotid doppler 3/27/12> 16-49% bilateral stenosis  Carotid doppler 2/10> mild (<40%) bilaterally   5. Pacemaker, [employer]: placed at [facility] 11/2014 by Dr. Babita Zaldivar. PPM checked regularly in my office. Not compatible for MRI      Plan:  Mr Misa Madrigal appears stable. No med changes. Continue riding stationary bike. FU     Thank you for allowing to me to participate in the care of Risa Siddiqui. Scribe's attestation: This note was scribed in the presence of Dr Margarito Chatterjee MD by Darylene Days, JESUS. The scribe's documentation has been prepared under my direction and personally reviewed by me in its entirety. I confirm that the note above accurately reflects all work, treatment, procedures, and medical decision making performed by me.

## 2021-06-03 ENCOUNTER — NURSE ONLY (OUTPATIENT)
Dept: CARDIOLOGY CLINIC | Age: 86
End: 2021-06-03
Payer: MEDICARE

## 2021-06-03 DIAGNOSIS — I49.5 SINOATRIAL NODE DYSFUNCTION (HCC): ICD-10-CM

## 2021-06-03 DIAGNOSIS — Z95.0 PACEMAKER: ICD-10-CM

## 2021-06-03 PROCEDURE — 93280 PM DEVICE PROGR EVAL DUAL: CPT | Performed by: INTERNAL MEDICINE

## 2021-09-08 ENCOUNTER — TELEPHONE (OUTPATIENT)
Dept: CARDIOLOGY CLINIC | Age: 86
End: 2021-09-08

## 2021-09-08 DIAGNOSIS — E78.49 OTHER HYPERLIPIDEMIA: Primary | ICD-10-CM

## 2021-09-09 ENCOUNTER — NURSE ONLY (OUTPATIENT)
Dept: CARDIOLOGY CLINIC | Age: 86
End: 2021-09-09
Payer: MEDICARE

## 2021-09-09 DIAGNOSIS — Z95.0 PACEMAKER: ICD-10-CM

## 2021-09-09 DIAGNOSIS — I49.5 SINOATRIAL NODE DYSFUNCTION (HCC): ICD-10-CM

## 2021-09-09 PROCEDURE — 93280 PM DEVICE PROGR EVAL DUAL: CPT | Performed by: INTERNAL MEDICINE

## 2021-09-10 ENCOUNTER — OFFICE VISIT (OUTPATIENT)
Dept: CARDIOLOGY CLINIC | Age: 86
End: 2021-09-10
Payer: MEDICARE

## 2021-09-10 VITALS
OXYGEN SATURATION: 97 % | SYSTOLIC BLOOD PRESSURE: 134 MMHG | DIASTOLIC BLOOD PRESSURE: 80 MMHG | WEIGHT: 172.2 LBS | HEART RATE: 64 BPM | HEIGHT: 68 IN | BODY MASS INDEX: 26.1 KG/M2

## 2021-09-10 DIAGNOSIS — I25.10 CORONARY ARTERY DISEASE INVOLVING NATIVE CORONARY ARTERY OF NATIVE HEART WITHOUT ANGINA PECTORIS: Primary | ICD-10-CM

## 2021-09-10 DIAGNOSIS — I95.1 ORTHOSTATIC HYPOTENSION: ICD-10-CM

## 2021-09-10 DIAGNOSIS — I65.23 BILATERAL CAROTID ARTERY STENOSIS: ICD-10-CM

## 2021-09-10 DIAGNOSIS — Z95.0 PACEMAKER: ICD-10-CM

## 2021-09-10 DIAGNOSIS — E78.49 OTHER HYPERLIPIDEMIA: ICD-10-CM

## 2021-09-10 PROCEDURE — G8417 CALC BMI ABV UP PARAM F/U: HCPCS | Performed by: NURSE PRACTITIONER

## 2021-09-10 PROCEDURE — 99214 OFFICE O/P EST MOD 30 MIN: CPT | Performed by: NURSE PRACTITIONER

## 2021-09-10 PROCEDURE — 1036F TOBACCO NON-USER: CPT | Performed by: NURSE PRACTITIONER

## 2021-09-10 PROCEDURE — G8427 DOCREV CUR MEDS BY ELIG CLIN: HCPCS | Performed by: NURSE PRACTITIONER

## 2021-09-10 PROCEDURE — 1123F ACP DISCUSS/DSCN MKR DOCD: CPT | Performed by: NURSE PRACTITIONER

## 2021-09-10 PROCEDURE — 4040F PNEUMOC VAC/ADMIN/RCVD: CPT | Performed by: NURSE PRACTITIONER

## 2021-09-10 NOTE — PROGRESS NOTES
[address]   Cardiac Evaluation      Patient: Clarissa Kaplan  YOB: 1934  Date: 9/10/21     Chief Complaint   Patient presents with    Hypertension     sometimes dizzy when bending over    6 Month Follow-Up      Referring provider: Margo Strauss MD    History of Present Illness:   Mr. Cyndy Barron comes to the office today for follow up of his coronary disease, hypertension, hyperlipidemia. He has a history of coronary disease with CABG in 2005. He had a nuclear stress March, 2013. He had a pacemaker placed 11/14 at [facility] after a syncopal episode with HR 30's. He had been following with both me and [facility] cardiologists for pacemaker checks and office visits. He is now just following with me. Mr Cyndy Barron was hospitalized 11/21/20 at [facility] w/ ? Syncopal episode. He states he was changing paper towel roll at home and felt lightheaded and dizzy. He states he fell back into his bathtub. His wife called [phone] and he was taken to [facility]. He was orthostatic and given IV fluids. He tested + for Covid, as well. Mr Cyndy Barron was advised to wear compression hose, which he is doing now. Lisinopril was discontinued and Metoprolol changed to Toprol XL. Mr Cyndy Barron says he has been feeling well and will be cutting the grass this afternoon. Denies chest pain, shortness of breath, palpitations, or edema. He experiences some dizziness first thing in the morning when he wakes up. He sits on the side of the bed and puts his head between his legs. Rides his stationary bike 2x a day for 6-10 minutes at a time. Sometimes he gets short of breath with that and thinks he pedals too fast.     Past Medical History:   has a past medical history of Asthma, CAD (coronary artery disease), and Hyperlipidemia. Surgical History:   has a past surgical history that includes Coronary artery bypass graft. Social History:   reports that he has never smoked.  He has never used smokeless tobacco. He reports that he does not drink alcohol and does not use drugs. Family History:  family history includes Heart Disease in his father and mother. Allergies:  Gabapentin     Current Outpatient Medications on File Prior to Visit   Medication Sig Dispense Refill    metoprolol succinate (TOPROL XL) 50 MG extended release tablet Take 50 mg by mouth 2 times daily       zinc 50 MG CAPS Take by mouth      Cholecalciferol (VITAMIN D3) 25 MCG (1000 UT) CAPS Take by mouth      Ascorbic Acid (VITAMIN C) 250 MG tablet Take 250 mg by mouth daily      atorvastatin (LIPITOR) 20 MG tablet TAKE 1 TABLET DAILY 90 tablet 3    nitroGLYCERIN (NITROQUICK) 0.4 MG SL tablet Place 1 tablet under the tongue every 5 minutes as needed for Chest pain. 1 Tablet SL PRN as needed for chest pain 25 tablet 6    aspirin 81 MG tablet Take 81 mg by mouth daily.  Multiple Vitamins-Minerals (CENTRUM SILVER) TABS Take 1 tablet by mouth daily. No current facility-administered medications on file prior to visit. Review of Systems:   · Constitutional: there has been no unanticipated weight loss. No change in energy or activity level   · Eyes: No visual changes   · ENT: No Headaches, hearing loss or vertigo. No mouth sores or sore throat. · Cardiovascular: Reviewed in HPI  · Respiratory: No cough or wheezing, no sputum production. No hematemesis. · Gastrointestinal: No abdominal pain, appetite loss, blood in stools. No change in bowel or bladder habits. · Genitourinary: No nocturia, dysuria, trouble voiding  · Musculoskeletal:  No gait disturbance, weakness or joint complaints. · Integumentary: No rash or pruritis. · Neurological: No headache, change in muscle strength, numbness or tingling. No change in gait, balance, coordination, mood, affect, memory, mentation, behavior.   · Psychiatric: No anxiety or depression  · Endocrine: No malaise or fever  · Hematologic/Lymphatic: No abnormal bruising or bleeding, blood clots or swollen lymph ischemia. EF 45%  --GXT Edvin 7/18/07> small to moderate sized inferolateral fixed defect consistent with infarction   2. Orthostatic hypotension: remains lightheaded. 134/80 sitting, 128/76 standing    3. Other and unspecified hyperlipidemia: labs are stable on labs 9/8/21: LDL 56 on [id number]   4. Carotid stenosis: Stable  Carotid doppler 3/17> 1-15% bilateral  Carotid doppler 3/27/12> 16-49% bilateral stenosis  Carotid doppler 2/10> mild (<40%) bilaterally   5. Pacemaker, [employer]: placed at [facility] 11/2014 by Dr. Capri Paul. PPM checked regularly in my office. Not compatible for MRI      Plan:  Recommends he just sit on the side of the bed before getting out of bed to get his bearings without putting head between legs. No other medication changes at this time. Schedule appointment in 6 months. Consider repeating carotids at that time. Thank you for allowing to me to participate in the care of Arun Del Vallekelechi.

## 2021-12-07 ENCOUNTER — NURSE ONLY (OUTPATIENT)
Dept: CARDIOLOGY CLINIC | Age: 86
End: 2021-12-07
Payer: MEDICARE

## 2021-12-07 DIAGNOSIS — Z95.0 PACEMAKER: ICD-10-CM

## 2021-12-07 DIAGNOSIS — I49.5 SINOATRIAL NODE DYSFUNCTION (HCC): ICD-10-CM

## 2021-12-07 PROCEDURE — 93280 PM DEVICE PROGR EVAL DUAL: CPT | Performed by: INTERNAL MEDICINE

## 2021-12-28 RX ORDER — METOPROLOL SUCCINATE 50 MG/1
50 TABLET, EXTENDED RELEASE ORAL 2 TIMES DAILY
Qty: 180 TABLET | Refills: 2 | Status: SHIPPED | OUTPATIENT
Start: 2021-12-28 | End: 2022-02-07 | Stop reason: SDUPTHER

## 2022-02-07 RX ORDER — METOPROLOL SUCCINATE 50 MG/1
50 TABLET, EXTENDED RELEASE ORAL 2 TIMES DAILY
Qty: 180 TABLET | Refills: 2 | Status: SHIPPED | OUTPATIENT
Start: 2022-02-07 | End: 2022-09-07

## 2022-03-16 DIAGNOSIS — E78.49 OTHER HYPERLIPIDEMIA: Primary | ICD-10-CM

## 2022-03-16 LAB
A/G RATIO: 1.3 (ref 1–2)
ALBUMIN SERPL-MCNC: 4 G/DL (ref 3.5–5.7)
ALBUMIN/PROTEIN TOTAL, SER/PL: 7.1 G/DL (ref 6–8.3)
ALP BLD-CCNC: 56 U/L (ref 34–104)
ALT SERPL-CCNC: 27 U/L (ref 7–52)
ANION GAP 4: 9 MMOL/L (ref 7–16)
AST W/O P-5'-P: 24 U/L (ref 13–39)
BILIRUB SERPL-MCNC: 0.9 MG/DL (ref 0.3–1)
BUN BLDV-MCNC: 13 MG/DL (ref 7–25)
CALCIUM SERPL-MCNC: 9.8 MG/DL (ref 8.6–10.2)
CHLORIDE BLD-SCNC: 104 MMOL/L (ref 98–107)
CHOLESTEROL, STONE: 138 MG/DL
CO2: 26 MMOL/L (ref 21–31)
CREATININE + EGFR PANEL: 1.2 MG/DL (ref 0.7–1.3)
GFR CALCULATED: 57 ML/MIN/1.73M2
GFR CALCULATED: > 60 ML/MIN/1.73M2
GLOBULIN: 3.1 G/DL (ref 2.6–4.2)
GLUCOSE: 106 MG/DL (ref 74–109)
HDLC SERPL-MCNC: 35 MG/DL (ref 40–60)
LDL CHOLESTEROL CALCULATED: 61 MG/DL (ref 0–99)
POTASSIUM SERPL-SCNC: 4.3 MMOL/L (ref 3.5–5.3)
SODIUM BLD-SCNC: 139 MMOL/L (ref 136–145)
TRIGL SERPL-MCNC: 209 MG/DL
VLDLC SERPL CALC-MCNC: 42 MG/DL (ref 0–40)

## 2022-03-17 ENCOUNTER — NURSE ONLY (OUTPATIENT)
Dept: CARDIOLOGY CLINIC | Age: 87
End: 2022-03-17
Payer: MEDICARE

## 2022-03-17 ENCOUNTER — OFFICE VISIT (OUTPATIENT)
Dept: CARDIOLOGY CLINIC | Age: 87
End: 2022-03-17
Payer: MEDICARE

## 2022-03-17 VITALS
DIASTOLIC BLOOD PRESSURE: 72 MMHG | OXYGEN SATURATION: 97 % | SYSTOLIC BLOOD PRESSURE: 130 MMHG | HEART RATE: 79 BPM | WEIGHT: 180 LBS | HEIGHT: 68 IN | BODY MASS INDEX: 27.28 KG/M2

## 2022-03-17 DIAGNOSIS — Z95.0 PACEMAKER: Primary | ICD-10-CM

## 2022-03-17 DIAGNOSIS — Z95.0 PACEMAKER: ICD-10-CM

## 2022-03-17 DIAGNOSIS — E78.49 OTHER HYPERLIPIDEMIA: ICD-10-CM

## 2022-03-17 DIAGNOSIS — I10 ESSENTIAL HYPERTENSION, BENIGN: ICD-10-CM

## 2022-03-17 DIAGNOSIS — I49.5 SINOATRIAL NODE DYSFUNCTION (HCC): ICD-10-CM

## 2022-03-17 DIAGNOSIS — I25.10 ATHEROSCLEROSIS OF NATIVE CORONARY ARTERY OF NATIVE HEART WITHOUT ANGINA PECTORIS: ICD-10-CM

## 2022-03-17 DIAGNOSIS — I65.23 BILATERAL CAROTID ARTERY STENOSIS: ICD-10-CM

## 2022-03-17 PROCEDURE — G8484 FLU IMMUNIZE NO ADMIN: HCPCS | Performed by: INTERNAL MEDICINE

## 2022-03-17 PROCEDURE — 99214 OFFICE O/P EST MOD 30 MIN: CPT | Performed by: INTERNAL MEDICINE

## 2022-03-17 PROCEDURE — 1036F TOBACCO NON-USER: CPT | Performed by: INTERNAL MEDICINE

## 2022-03-17 PROCEDURE — 93280 PM DEVICE PROGR EVAL DUAL: CPT | Performed by: INTERNAL MEDICINE

## 2022-03-17 PROCEDURE — 4040F PNEUMOC VAC/ADMIN/RCVD: CPT | Performed by: INTERNAL MEDICINE

## 2022-03-17 PROCEDURE — G8417 CALC BMI ABV UP PARAM F/U: HCPCS | Performed by: INTERNAL MEDICINE

## 2022-03-17 PROCEDURE — 1123F ACP DISCUSS/DSCN MKR DOCD: CPT | Performed by: INTERNAL MEDICINE

## 2022-03-17 PROCEDURE — G8428 CUR MEDS NOT DOCUMENT: HCPCS | Performed by: INTERNAL MEDICINE

## 2022-03-17 NOTE — PROGRESS NOTES
[address]   Cardiac Evaluation      Patient: Mar Mcardle  YOB: 1934  Date: 3/17/22     Chief Complaint   Patient presents with    Coronary Artery Disease    Hyperlipidemia    Hypertension      Referring provider: Jl Borges MD    History of Present Illness:   Mr. Adal Barboza comes to the office today for follow up of his coronary disease, hypertension, hyperlipidemia. He has a history of coronary disease with CABG in 2005. He had a nuclear stress March, 2013. He had a pacemaker placed 11/14 at [facility] after a syncopal episode with HR 30's. He had been following with both me and [facility] cardiologists for pacemaker checks and office visits. He is now just following with me. Mr Adal Barboza was hospitalized 11/21/20 at [facility] w/ ? Syncopal episode. He states he was changing paper towel roll at home and felt lightheaded and dizzy. He states he fell back into his bathtub. His wife called 46 and he was taken to [facility]. He was orthostatic and given IV fluids. He tested + for Covid, as well. Mr Adal Barboza was advised to wear compression hose, which he is doing now. Lisinopril was discontinued and Metoprolol changed to Toprol XL. Mr Adal Barboza states he has been doing ok. He denies chest pain, palpitations, SANDHU, dizziness, or edema. He rides a stationary bike for exercise. He gets up frequently at night to urinate. Past Medical History:   has a past medical history of Asthma, CAD (coronary artery disease), and Hyperlipidemia. Surgical History:   has a past surgical history that includes Coronary artery bypass graft. Social History:   reports that he has never smoked. He has never used smokeless tobacco. He reports that he does not drink alcohol and does not use drugs. Family History:  family history includes Heart Disease in his father and mother.      Allergies:  Gabapentin     Current Outpatient Medications on File Prior to Visit   Medication Sig Dispense Refill    metoprolol succinate (TOPROL XL) 50 MG extended release tablet Take 1 tablet by mouth 2 times daily 180 tablet 2    zinc 50 MG CAPS Take by mouth      Cholecalciferol (VITAMIN D3) 25 MCG (1000 UT) CAPS Take by mouth      Ascorbic Acid (VITAMIN C) 250 MG tablet Take 250 mg by mouth daily      atorvastatin (LIPITOR) 20 MG tablet TAKE 1 TABLET DAILY 90 tablet 3    aspirin 81 MG tablet Take 81 mg by mouth daily.  Multiple Vitamins-Minerals (CENTRUM SILVER) TABS Take 1 tablet by mouth daily.  nitroGLYCERIN (NITROQUICK) 0.4 MG SL tablet Place 1 tablet under the tongue every 5 minutes as needed for Chest pain. 1 Tablet SL PRN as needed for chest pain 25 tablet 6     No current facility-administered medications on file prior to visit. Review of Systems:   · Constitutional: there has been no unanticipated weight loss. No change in energy or activity level   · Eyes: No visual changes   · ENT: No Headaches, hearing loss or vertigo. No mouth sores or sore throat. · Cardiovascular: Reviewed in HPI  · Respiratory: No cough or wheezing, no sputum production. No hematemesis. · Gastrointestinal: No abdominal pain, appetite loss, blood in stools. No change in bowel or bladder habits. · Genitourinary: No nocturia, dysuria, trouble voiding  · Musculoskeletal:  No gait disturbance, weakness or joint complaints. · Integumentary: No rash or pruritis. · Neurological: No headache, change in muscle strength, numbness or tingling. No change in gait, balance, coordination, mood, affect, memory, mentation, behavior. · Psychiatric: No anxiety or depression  · Endocrine: No malaise or fever  · Hematologic/Lymphatic: No abnormal bruising or bleeding, blood clots or swollen lymph nodes. · Allergic/Immunologic: No nasal congestion or hives.     Physical Examination:    Vitals:    03/17/22 1319   BP: 130/72   Site: Left Upper Arm   Position: Sitting   Cuff Size: Medium Adult   Pulse: 79   SpO2: 97%   Weight: 180 lb (81.6 kg)   Height: 5' 8\" (1.727 m)     Wt Readings from Last 3 Encounters:   03/17/22 180 lb (81.6 kg)   09/10/21 172 lb 3.2 oz (78.1 kg)   03/03/21 175 lb (79.4 kg)     BP Readings from Last 3 Encounters:   03/17/22 130/72   09/10/21 134/80   03/03/21 120/66     Constitutional and General Appearance:  appears stated age  Respiratory:  · Normal excursion and expansion without use of accessory muscles  · Resp Auscultation: Normal breath sounds without dullness  Cardiovascular:  · The apical impulses not displaced  · Heart is regular rate and rhythm with normal S1, S2  · The carotid upstroke is normal, no bruit noted   · JVP is not elevated  · Peripheral pulses are symmetrical  · There is no clubbing, cyanosis of the extremities  · No edema  · Femoral Arteries: 2+ and equal  · Pedal Pulses: 2+ and equal   Abdomen:  · No masses or tenderness  · Normal bowel sounds  Neurological/Psychiatric:  · Alert and oriented x3  · Moves all extremities well  · Exhibits normal gait balance and coordination    Assessment:  1. Coronary  artery disease: Stable, no anginal symptoms  CABG 6/3/05> LIMA-LAD, SVG-diagonal and OM, SVG-posterior ventricular and posterior descending branches of RC system  --GXT Edvin 11/14> ([address]) Abnormal myocardial perfusion study with a large severe perfusion abnormality inferior wall reflecting myocardial scar. EF 48%  --Echo 11/14> ([address])  EF 45-50%, Aortic valve: Valve area: 2.05cm^2 (Vmax). Left atrium: The  atrium was mildly dilated  --GXT Edvin 3/13> No diagnostic EKG evidence for stress induced ischemia, very frequent PVC's and couplets. Moderate area of decreased perfusion in inferior wall with stress and rest consistent with scar, no evidence for reversible ischemia. EF 45%  --GXT Edvin 7/18/07> small to moderate sized inferolateral fixed defect consistent with infarction   2. Orthostatic hypotension: remains lightheaded. 134/80 sitting, 128/76 standing    3.  Other and unspecified hyperlipidemia: labs 3/16/22: ; TRIG 209; HDL 35; LDL 61, Lipitor 20mg daily   4. Carotid stenosis: Stable  Carotid doppler 3/17> 16-49% bilateral  Carotid doppler 3/27/12> 16-49% bilateral stenosis  Carotid doppler 2/10> mild (<40%) bilaterally   5. Pacemaker, [employer]: placed at [facility] 11/2014 by Dr. Margi Wharton. PPM checked regularly in my office. Not compatible for MRI  ~ NSVT noted on PPM check today      Plan:  Repeat echo and carotid doppler. No med changes. Encouraged to continue regular exercise. FU 6 months. He may benefit from seeing a urologist or his family doctor about his nocturia. Scribe's attestation: This note was scribed in the presence of Dr Montana Root MD by Jayden Bradley RN. The scribe's documentation has been prepared under my direction and personally reviewed by me in its entirety. I confirm that the note above accurately reflects all work, treatment, procedures, and medical decision making performed by me. Thank you for allowing to me to participate in the care of Jordyn Whalen.

## 2022-04-01 ENCOUNTER — HOSPITAL ENCOUNTER (OUTPATIENT)
Dept: CARDIOLOGY | Age: 87
Discharge: HOME | End: 2022-04-01
Payer: MEDICARE

## 2022-04-01 DIAGNOSIS — I25.10 ATHEROSCLEROSIS OF NATIVE CORONARY ARTERY OF NATIVE HEART WITHOUT ANGINA PECTORIS: ICD-10-CM

## 2022-04-01 DIAGNOSIS — I65.23 BILATERAL CAROTID ARTERY STENOSIS: ICD-10-CM

## 2022-04-01 DIAGNOSIS — I10 ESSENTIAL HYPERTENSION, BENIGN: ICD-10-CM

## 2022-04-01 PROCEDURE — 93880 EXTRACRANIAL BILAT STUDY: CPT

## 2022-04-01 PROCEDURE — 93306 TTE W/DOPPLER COMPLETE: CPT

## 2022-06-30 ENCOUNTER — NURSE ONLY (OUTPATIENT)
Dept: CARDIOLOGY CLINIC | Age: 87
End: 2022-06-30
Payer: MEDICARE

## 2022-06-30 DIAGNOSIS — I49.5 SINOATRIAL NODE DYSFUNCTION (HCC): ICD-10-CM

## 2022-06-30 DIAGNOSIS — Z95.0 PACEMAKER: ICD-10-CM

## 2022-06-30 PROCEDURE — 93280 PM DEVICE PROGR EVAL DUAL: CPT | Performed by: INTERNAL MEDICINE

## 2022-06-30 NOTE — PROGRESS NOTES
Patient comes in for programming evaluation for his pacemaker. All sensing and pacing parameters are within normal range. Noted NSVT and AT. Pt is on Toprol. No changes need to be made at this time. Please see interrogation for more detail. Patient will follow up in 3 months in office or remotely.

## 2022-08-29 ENCOUNTER — NURSE ONLY (OUTPATIENT)
Dept: CARDIOLOGY CLINIC | Age: 87
End: 2022-08-29
Payer: MEDICARE

## 2022-08-29 DIAGNOSIS — E78.49 OTHER HYPERLIPIDEMIA: Primary | ICD-10-CM

## 2022-08-29 DIAGNOSIS — Z95.0 PACEMAKER: Primary | ICD-10-CM

## 2022-08-29 DIAGNOSIS — I49.5 SINOATRIAL NODE DYSFUNCTION (HCC): ICD-10-CM

## 2022-08-29 PROCEDURE — 93280 PM DEVICE PROGR EVAL DUAL: CPT | Performed by: INTERNAL MEDICINE

## 2022-08-29 NOTE — PROGRESS NOTES
Patient comes in for programming evaluation for his pacemaker. All sensing and pacing parameters are within normal range. Noted NSVT. Pt is on Toprol. No changes need to be made at this time. Please see interrogation for more detail. AP 30%   41%    Patient will follow up in 3 months in office or remotely.

## 2022-09-07 RX ORDER — METOPROLOL SUCCINATE 50 MG/1
TABLET, EXTENDED RELEASE ORAL
Qty: 180 TABLET | Refills: 3 | Status: SHIPPED | OUTPATIENT
Start: 2022-09-07

## 2022-09-08 ENCOUNTER — OFFICE VISIT (OUTPATIENT)
Dept: CARDIOLOGY CLINIC | Age: 87
End: 2022-09-08
Payer: MEDICARE

## 2022-09-08 VITALS
HEART RATE: 64 BPM | SYSTOLIC BLOOD PRESSURE: 112 MMHG | OXYGEN SATURATION: 97 % | BODY MASS INDEX: 27.13 KG/M2 | HEIGHT: 68 IN | DIASTOLIC BLOOD PRESSURE: 62 MMHG | WEIGHT: 179 LBS

## 2022-09-08 DIAGNOSIS — Z95.0 PACEMAKER: ICD-10-CM

## 2022-09-08 DIAGNOSIS — E78.49 OTHER HYPERLIPIDEMIA: ICD-10-CM

## 2022-09-08 DIAGNOSIS — I25.10 ATHEROSCLEROSIS OF NATIVE CORONARY ARTERY OF NATIVE HEART WITHOUT ANGINA PECTORIS: Primary | ICD-10-CM

## 2022-09-08 DIAGNOSIS — I10 ESSENTIAL HYPERTENSION, BENIGN: ICD-10-CM

## 2022-09-08 PROCEDURE — 99214 OFFICE O/P EST MOD 30 MIN: CPT | Performed by: INTERNAL MEDICINE

## 2022-09-08 PROCEDURE — G8417 CALC BMI ABV UP PARAM F/U: HCPCS | Performed by: INTERNAL MEDICINE

## 2022-09-08 PROCEDURE — G8427 DOCREV CUR MEDS BY ELIG CLIN: HCPCS | Performed by: INTERNAL MEDICINE

## 2022-09-08 PROCEDURE — 1036F TOBACCO NON-USER: CPT | Performed by: INTERNAL MEDICINE

## 2022-09-08 PROCEDURE — 1123F ACP DISCUSS/DSCN MKR DOCD: CPT | Performed by: INTERNAL MEDICINE

## 2022-09-08 RX ORDER — TRAZODONE HYDROCHLORIDE 50 MG/1
TABLET ORAL
COMMUNITY
Start: 2022-07-11

## 2022-09-08 NOTE — PROGRESS NOTES
[facility], operated by [facility]   Cardiac Evaluation      Patient: Hakan Holt  YOB: 1934  Date: 9/8/22     Chief Complaint   Patient presents with    Coronary Artery Disease    Hyperlipidemia    Hypertension        Referring provider: Cory Weston MD    History of Present Illness:   Mr. Crystal Dancer comes to the office today for follow up of his coronary disease, hypertension, hyperlipidemia. He has a history of coronary disease with CABG in 2005. He had a nuclear stress March, 2013. He had a pacemaker placed 11/14 at [facility] after a syncopal episode with HR 30's. He had been following with both me and [facility] cardiologists for pacemaker checks and office visits. He is now just following with me. Mr Crystal Dancer was hospitalized 11/21/20 at [facility] w/ ? Syncopal episode. He states he was changing paper towel roll at home and felt lightheaded and dizzy. He states he fell back into his bathtub. His wife called 46 and he was taken to [facility]. He was orthostatic and given IV fluids. He tested + for Covid, as well. Mr Crystal Dancer was advised to wear compression hose, which he is doing now. Lisinopril was discontinued and Metoprolol changed to Toprol XL. Mr Crystal Dancer states he still has dizziness which is primarily gait unsteadiness. He does not do regular exercise. He has a stationary bike but has not been riding it. Carlyle Delaney denies chest pain, palpitations, or edema. Past Medical History:   has a past medical history of Asthma, CAD (coronary artery disease), and Hyperlipidemia. Surgical History:   has a past surgical history that includes Coronary artery bypass graft. Social History:   reports that he has never smoked. He has never used smokeless tobacco. He reports that he does not drink alcohol and does not use drugs. Family History:  family history includes Heart Disease in his father and mother.      Allergies:  Gabapentin     Current Outpatient Medications on File Prior to Visit   Medication Sig Dispense Refill traZODone (DESYREL) 50 MG tablet       metoprolol succinate (TOPROL XL) 50 MG extended release tablet TAKE 1 TABLET BY MOUTH  TWICE DAILY 180 tablet 3    zinc 50 MG CAPS Take by mouth      Cholecalciferol (VITAMIN D3) 25 MCG (1000 UT) CAPS Take by mouth      atorvastatin (LIPITOR) 20 MG tablet TAKE 1 TABLET DAILY 90 tablet 3    aspirin 81 MG tablet Take 81 mg by mouth daily. Multiple Vitamins-Minerals (CENTRUM SILVER) TABS Take 1 tablet by mouth daily. Ascorbic Acid (VITAMIN C) 250 MG tablet Take 250 mg by mouth daily      nitroGLYCERIN (NITROQUICK) 0.4 MG SL tablet Place 1 tablet under the tongue every 5 minutes as needed for Chest pain. 1 Tablet SL PRN as needed for chest pain 25 tablet 6     No current facility-administered medications on file prior to visit. Review of Systems:   Constitutional: there has been no unanticipated weight loss. No change in energy or activity level   Eyes: No visual changes   ENT: No Headaches, hearing loss or vertigo. No mouth sores or sore throat. Cardiovascular: Reviewed in HPI  Respiratory: No cough or wheezing, no sputum production. No hematemesis. Gastrointestinal: No abdominal pain, appetite loss, blood in stools. No change in bowel or bladder habits. Genitourinary: No nocturia, dysuria, trouble voiding  Musculoskeletal:  No gait disturbance, weakness or joint complaints. Integumentary: No rash or pruritis. Neurological: No headache, change in muscle strength, numbness or tingling. No change in gait, balance, coordination, mood, affect, memory, mentation, behavior. Psychiatric: No anxiety or depression  Endocrine: No malaise or fever  Hematologic/Lymphatic: No abnormal bruising or bleeding, blood clots or swollen lymph nodes. Allergic/Immunologic: No nasal congestion or hives.     Physical Examination:    Vitals:    09/08/22 1417   BP: 112/62   Site: Left Upper Arm   Position: Sitting   Cuff Size: Medium Adult   Pulse: 64   SpO2: 97%   Weight: 179 lb (81.2 kg)   Height: 5' 8\" (1.727 m)       Wt Readings from Last 3 Encounters:   09/08/22 179 lb (81.2 kg)   03/17/22 180 lb (81.6 kg)   09/10/21 172 lb 3.2 oz (78.1 kg)     BP Readings from Last 3 Encounters:   09/08/22 112/62   03/17/22 130/72   09/10/21 134/80     Constitutional and General Appearance:  appears stated age  Respiratory:  Normal excursion and expansion without use of accessory muscles  Resp Auscultation: Normal breath sounds without dullness  Cardiovascular: The apical impulses not displaced  Heart is regular rate and rhythm with normal S1, S2  The carotid upstroke is normal, no bruit noted   JVP is not elevated  Peripheral pulses are symmetrical  There is no clubbing, cyanosis of the extremities  No edema  Femoral Arteries: 2+ and equal  Pedal Pulses: 2+ and equal   Abdomen:  No masses or tenderness  Normal bowel sounds  Neurological/Psychiatric:  Alert and oriented x3  Moves all extremities well  Exhibits normal gait balance and coordination    Assessment:  1. Coronary  artery disease: Stable, no anginal symptoms  Echo 3/23/22: EF 45-50%. Grade 2 DD. CABG 6/3/05> LIMA-LAD, SVG-diagonal and OM, SVG-posterior ventricular and posterior descending branches of RC system  --GXT Edvin 11/14> ([address]) Abnormal myocardial perfusion study with a large severe perfusion abnormality inferior wall reflecting myocardial scar. EF 48%  --Echo 11/14> ([address])  EF 45-50%, Aortic valve: Valve area: 2.05cm^2 (Vmax). Left atrium: The  atrium was mildly dilated  --GXT Edvin 3/13> No diagnostic EKG evidence for stress induced ischemia, very frequent PVC's and couplets. Moderate area of decreased perfusion in inferior wall with stress and rest consistent with scar, no evidence for reversible ischemia. EF 45%  --GXT Edvin 7/18/07> small to moderate sized inferolateral fixed defect consistent with infarction   2. Orthostatic hypotension: remains dizzy     3.  Other and unspecified hyperlipidemia: labs 3/16/22: ; TRIG 209; HDL 35; LDL 61, Lipitor 20mg daily   4. Carotid stenosis: Stable  Carotid doppler 4/1/22: <50% bilateral  Carotid doppler 3/17> 16-49% bilateral  Carotid doppler 3/27/12> 16-49% bilateral stenosis  Carotid doppler 2/10> mild (<40%) bilaterally   5. Pacemaker, [employer]: placed at [facility] 11/2014 by Dr. Hailey Pichardo. PPM checked regularly in my office. Not compatible for MRI  ~ NSVT noted on PPM check       Plan:  Mr Vangie Her has been advised to start riding his stationary bike for exercise and to use a cane or walker with ambulating. FU 6 months. Scribe's attestation: This note was scribed in the presence of Dr Jessica Starr MD by Hemant Marie RN. The scribe's documentation has been prepared under my direction and personally reviewed by me in its entirety. I confirm that the note above accurately reflects all work, treatment, procedures, and medical decision making performed by me. Thank you for allowing to me to participate in the care of Yecenia Reyez.

## 2022-12-01 ENCOUNTER — NURSE ONLY (OUTPATIENT)
Dept: CARDIOLOGY CLINIC | Age: 87
End: 2022-12-01
Payer: MEDICARE

## 2022-12-01 DIAGNOSIS — Z95.0 PACEMAKER: Primary | ICD-10-CM

## 2022-12-01 DIAGNOSIS — I49.5 SINOATRIAL NODE DYSFUNCTION (HCC): ICD-10-CM

## 2022-12-01 PROCEDURE — 93280 PM DEVICE PROGR EVAL DUAL: CPT | Performed by: INTERNAL MEDICINE

## 2022-12-01 NOTE — PROGRESS NOTES
Patient comes in for programming evaluation for his pacemaker. All sensing and pacing parameters are within normal range. Noted NSVT. Pt is on Toprol. No changes need to be made at this time. Please see interrogation for more detail. AP 32%   63%    Patient will follow up in 3 months in office or remotely.

## 2023-01-27 ENCOUNTER — TELEPHONE (OUTPATIENT)
Dept: CARDIOLOGY CLINIC | Age: 88
End: 2023-01-27

## 2023-01-27 NOTE — TELEPHONE ENCOUNTER
Spoke w/ pt's daughter. She requested Payton Alvarado be seen soon regarding this recent hospitalization. She states his EF is down and he has been falling. Appt made for 1/30/23 at 245pm with Dr Shantel Chaparro. Julio's daughter was agreeable to this appt date/time.

## 2023-01-27 NOTE — TELEPHONE ENCOUNTER
Hospital discharge summary received from [facility] for Mr Liane Ascencio to see Dr Isai Raymond in 2-3 weeks. I called and LMOM to call for an appt.

## 2023-01-30 ENCOUNTER — OFFICE VISIT (OUTPATIENT)
Dept: CARDIOLOGY CLINIC | Age: 88
End: 2023-01-30
Payer: MEDICARE

## 2023-01-30 VITALS
SYSTOLIC BLOOD PRESSURE: 120 MMHG | WEIGHT: 177 LBS | OXYGEN SATURATION: 98 % | HEIGHT: 68 IN | BODY MASS INDEX: 26.83 KG/M2 | HEART RATE: 60 BPM | DIASTOLIC BLOOD PRESSURE: 66 MMHG

## 2023-01-30 DIAGNOSIS — I10 ESSENTIAL HYPERTENSION, BENIGN: ICD-10-CM

## 2023-01-30 DIAGNOSIS — Z95.0 PACEMAKER: ICD-10-CM

## 2023-01-30 DIAGNOSIS — I42.9 CARDIOMYOPATHY, UNSPECIFIED TYPE (HCC): ICD-10-CM

## 2023-01-30 DIAGNOSIS — R55 SYNCOPE, UNSPECIFIED SYNCOPE TYPE: ICD-10-CM

## 2023-01-30 DIAGNOSIS — E78.49 OTHER HYPERLIPIDEMIA: ICD-10-CM

## 2023-01-30 DIAGNOSIS — I25.10 ATHEROSCLEROSIS OF NATIVE CORONARY ARTERY OF NATIVE HEART WITHOUT ANGINA PECTORIS: Primary | ICD-10-CM

## 2023-01-30 PROCEDURE — 1036F TOBACCO NON-USER: CPT | Performed by: INTERNAL MEDICINE

## 2023-01-30 PROCEDURE — 1123F ACP DISCUSS/DSCN MKR DOCD: CPT | Performed by: INTERNAL MEDICINE

## 2023-01-30 PROCEDURE — 99214 OFFICE O/P EST MOD 30 MIN: CPT | Performed by: INTERNAL MEDICINE

## 2023-01-30 PROCEDURE — G8417 CALC BMI ABV UP PARAM F/U: HCPCS | Performed by: INTERNAL MEDICINE

## 2023-01-30 PROCEDURE — G8427 DOCREV CUR MEDS BY ELIG CLIN: HCPCS | Performed by: INTERNAL MEDICINE

## 2023-01-30 PROCEDURE — G8484 FLU IMMUNIZE NO ADMIN: HCPCS | Performed by: INTERNAL MEDICINE

## 2023-01-30 RX ORDER — METOPROLOL SUCCINATE 50 MG/1
25 TABLET, EXTENDED RELEASE ORAL DAILY
COMMUNITY

## 2023-01-30 NOTE — PROGRESS NOTES
[facility]   Cardiac Evaluation      Patient: Duane Bryan  YOB: 1934  Date: 1/30/23     Chief Complaint   Patient presents with    Coronary Artery Disease    Hypertension    Hyperlipidemia          Referring provider: Paige Goldman MD    History of Present Illness:   Mr. Azam Jarquin comes to the office today for follow up of his coronary disease, hypertension, hyperlipidemia. He has a history of coronary disease with CABG in 2005. He had a nuclear stress March, 2013. He had a pacemaker placed 11/14 at [facility] after a syncopal episode with HR 30's. He had been following with both me and [facility] cardiologists for pacemaker checks and office visits. He is now just following with me. Mr Azam Jarquin was hospitalized 11/21/20 at [facility] w/ ? Syncopal episode. He states he was changing paper towel roll at home and felt lightheaded and dizzy. He states he fell back into his bathtub. His wife called 46 and he was taken to [facility]. He was orthostatic and given IV fluids. He tested + for Covid, as well. Mr Azam Jarquin was advised to wear compression hose, which he is doing now. Lisinopril was discontinued and Metoprolol changed to Toprol XL. Mr Azam Jarquin was hospitalized at [facility] 1/26/23 after a syncopal episode. He was orthostatic and underwent an echo and carotid doppler. EF on echo was 25-30%. Toprol was cut in half. Mr Azam Jarquin states he felt dizzy last Thursday while sitting on a barstool and passed out. His wife called 46 and she states the EMT's \"worked on him\", did not perform CPR. He came to and was taken to [facility] where he was admitted overnight. Mai Malgorzata is currently having a urologic problem and has an appt with Dr Jason Ryan tomorrow. Mai Malgorzata states he continues to feel dizzy. He is unsteady on his feet. He is here with his daughter and wife. Past Medical History:   has a past medical history of Asthma, CAD (coronary artery disease), and Hyperlipidemia.     Surgical History:   has a past surgical history that includes Coronary artery bypass graft. Social History:   reports that he has never smoked. He has never used smokeless tobacco. He reports that he does not drink alcohol and does not use drugs. Family History:  family history includes Heart Disease in his father and mother. Allergies:  Gabapentin     Current Outpatient Medications on File Prior to Visit   Medication Sig Dispense Refill    metoprolol succinate (TOPROL XL) 50 MG extended release tablet Take 25 mg by mouth daily 1/2 tab daily      traZODone (DESYREL) 50 MG tablet       zinc 50 MG CAPS Take by mouth      Cholecalciferol (VITAMIN D3) 25 MCG (1000 UT) CAPS Take by mouth      Ascorbic Acid (VITAMIN C) 250 MG tablet Take 250 mg by mouth daily      atorvastatin (LIPITOR) 20 MG tablet TAKE 1 TABLET DAILY 90 tablet 3    aspirin 81 MG tablet Take 81 mg by mouth daily. Multiple Vitamins-Minerals (CENTRUM SILVER) TABS Take 1 tablet by mouth daily. nitroGLYCERIN (NITROQUICK) 0.4 MG SL tablet Place 1 tablet under the tongue every 5 minutes as needed for Chest pain. 1 Tablet SL PRN as needed for chest pain 25 tablet 6     No current facility-administered medications on file prior to visit. Review of Systems:   Constitutional: there has been no unanticipated weight loss. No change in energy or activity level   Eyes: No visual changes   ENT: No Headaches, hearing loss or vertigo. No mouth sores or sore throat. Cardiovascular: Reviewed in HPI  Respiratory: No cough or wheezing, no sputum production. No hematemesis. Gastrointestinal: No abdominal pain, appetite loss, blood in stools. No change in bowel or bladder habits. Genitourinary: No nocturia, dysuria, trouble voiding  Musculoskeletal:  No gait disturbance, weakness or joint complaints. Integumentary: No rash or pruritis. Neurological: No headache, change in muscle strength, numbness or tingling.  No change in gait, balance, coordination, mood, affect, memory, mentation, behavior. Psychiatric: No anxiety or depression  Endocrine: No malaise or fever  Hematologic/Lymphatic: No abnormal bruising or bleeding, blood clots or swollen lymph nodes. Allergic/Immunologic: No nasal congestion or hives. Physical Examination:    Vitals:    01/30/23 1501   BP: 120/66   Site: Left Upper Arm   Position: Sitting   Cuff Size: Medium Adult   Pulse: 60   SpO2: 98%   Weight: 177 lb (80.3 kg)   Height: 5' 8\" (1.727 m)         Wt Readings from Last 3 Encounters:   01/30/23 177 lb (80.3 kg)   09/08/22 179 lb (81.2 kg)   03/17/22 180 lb (81.6 kg)     BP Readings from Last 3 Encounters:   01/30/23 120/66   09/08/22 112/62   03/17/22 130/72     Constitutional and General Appearance:  appears stated age  Respiratory:  Normal excursion and expansion without use of accessory muscles  Resp Auscultation: Normal breath sounds without dullness  Cardiovascular: The apical impulses not displaced  Heart is regular rate and rhythm with normal S1, S2  The carotid upstroke is normal, no bruit noted   JVP is not elevated  Peripheral pulses are symmetrical  There is no clubbing, cyanosis of the extremities  No edema  Femoral Arteries: 2+ and equal  Pedal Pulses: 2+ and equal   Abdomen:  No masses or tenderness  Normal bowel sounds  Neurological/Psychiatric:  Alert and oriented x3; his memory is not normal.   Moves all extremities well  Exhibits normal gait balance and coordination    Assessment:  1. Coronary  artery disease:   Echo 1/27/23: EF 25-30%. He has not had an ischemic evaluation for many years; he has no angina. Echo 3/23/22: EF 45-50%. Grade 2 DD. CABG 6/3/05> LIMA-LAD, SVG-diagonal and OM, SVG-posterior ventricular and posterior descending branches of RC system  --GXT Edvin 11/14> ([address]) Abnormal myocardial perfusion study with a large severe perfusion abnormality inferior wall reflecting myocardial scar. EF 48%  --Echo 11/14> ([address])  EF 45-50%, Aortic valve: Valve area: 2.05cm^2 (Vmax).  Left atrium: The  atrium was mildly dilated  --GXT Edvin 3/13> No diagnostic EKG evidence for stress induced ischemia, very frequent PVC's and couplets. Moderate area of decreased perfusion in inferior wall with stress and rest consistent with scar, no evidence for reversible ischemia. EF 45%  --GXT Edvin 7/18/07> small to moderate sized inferolateral fixed defect consistent with infarction   2. Orthostatic hypotension: remains dizzy, 140/70 lying, 140/70 sitting, 122/60 standing. His BP does not drop that much to suggest that his spell was from this. 3. Other and unspecified hyperlipidemia: labs 3/16/22: ; TRIG 209; HDL 35; LDL 61, Lipitor 20mg daily   4. Carotid stenosis: Stable  Carotid doppler 1/27/23: <50% bilateral   Carotid doppler 4/1/22: <50% bilateral  Carotid doppler 3/17> 16-49% bilateral  Carotid doppler 3/27/12> 16-49% bilateral stenosis  Carotid doppler 2/10> mild (<40%) bilaterally   5. Pacemaker, [employer]: placed at [facility] 11/2014 by Dr. Tran Lamar. PPM checked regularly in my office. Not compatible for MRI  ~ NSVT noted on PPM check in the past.   Syncope - ? More VT. I will place a monitor and recheck a stress test.     Plan: Will schedule lexiscan stress test. Advised to record b/p readings and bring in next week. Will wear 30 day cardiac monitor. He should not be driving. Scribe's attestation: This note was scribed in the presence of Dr Valarie Lanier MD by Johnny Stephenson RN. The scribe's documentation has been prepared under my direction and personally reviewed by me in its entirety. I confirm that the note above accurately reflects all work, treatment, procedures, and medical decision making performed by me. Thank you for allowing to me to participate in the care of Sara Ring.

## 2023-02-07 ENCOUNTER — HOSPITAL ENCOUNTER (OUTPATIENT)
Dept: NON INVASIVE DIAGNOSTICS | Age: 88
Discharge: HOME | End: 2023-02-07
Payer: MEDICARE

## 2023-02-07 DIAGNOSIS — I25.10 ATHEROSCLEROSIS OF NATIVE CORONARY ARTERY OF NATIVE HEART WITHOUT ANGINA PECTORIS: ICD-10-CM

## 2023-02-07 DIAGNOSIS — I42.9 CARDIOMYOPATHY, UNSPECIFIED TYPE (HCC): ICD-10-CM

## 2023-02-07 DIAGNOSIS — R55 SYNCOPE, UNSPECIFIED SYNCOPE TYPE: ICD-10-CM

## 2023-02-07 LAB
LV EF: 29 %
LVEF MODALITY: NORMAL

## 2023-02-07 PROCEDURE — 3430000000 HC RX DIAGNOSTIC RADIOPHARMACEUTICAL: Performed by: INTERNAL MEDICINE

## 2023-02-07 PROCEDURE — 6360000002 HC RX W HCPCS: Performed by: INTERNAL MEDICINE

## 2023-02-07 PROCEDURE — 93017 CV STRESS TEST TRACING ONLY: CPT | Performed by: INTERNAL MEDICINE

## 2023-02-07 PROCEDURE — 78452 HT MUSCLE IMAGE SPECT MULT: CPT | Performed by: INTERNAL MEDICINE

## 2023-02-07 PROCEDURE — A9502 TC99M TETROFOSMIN: HCPCS | Performed by: INTERNAL MEDICINE

## 2023-02-07 RX ADMIN — REGADENOSON 0.4 MG: 0.08 INJECTION, SOLUTION INTRAVENOUS at 14:40

## 2023-02-07 RX ADMIN — TETROFOSMIN 30 MILLICURIE: 1.38 INJECTION, POWDER, LYOPHILIZED, FOR SOLUTION INTRAVENOUS at 14:35

## 2023-02-07 RX ADMIN — TETROFOSMIN 10 MILLICURIE: 1.38 INJECTION, POWDER, LYOPHILIZED, FOR SOLUTION INTRAVENOUS at 12:56

## 2023-02-07 NOTE — PROGRESS NOTES
Instructed on Lexiscan Stress Test Procedure including possible side effects/ adverse reactions. Patient verbalizes  understanding and denies having any questions . See [address] [place] [address] Cardiology

## 2023-02-09 ENCOUNTER — TELEPHONE (OUTPATIENT)
Dept: CARDIOLOGY CLINIC | Age: 88
End: 2023-02-09

## 2023-02-09 NOTE — TELEPHONE ENCOUNTER
Physical therapist called stating she was with Chantal King to work with him on balance, strength, and walking after his recent fall. She asked if he has any restrictions from a cardiac standpoint. Per Dr Calvin Collazo he does not have any restrictions.  I notified the physical therapist.

## 2023-02-15 NOTE — PROGRESS NOTES
[address]   Cardiac Evaluation      Patient: Sophia Santiago  YOB: 1934  Date: 2/28/23     Chief Complaint   Patient presents with    Hyperlipidemia    Hypertension    Coronary Artery Disease            Referring provider: Sola Eaton MD    History of Present Illness:   Mr. Emerita Arana comes to the office today for follow up of his coronary disease, hypertension, hyperlipidemia. He has a history of coronary disease with CABG in 2005. He had a nuclear stress March, 2013. He had a pacemaker placed 11/14 at [facility] after a syncopal episode with HR 30's. He had been following with both me and [facility] cardiologists for pacemaker checks and office visits. He is now just following with me. Mr Emerita Arana was hospitalized 11/21/20 at [facility] w/ ? Syncopal episode. He states he was changing paper towel roll at home and felt lightheaded and dizzy. He states he fell back into his bathtub. His wife called 46 and he was taken to [facility]. He was orthostatic and given IV fluids. He tested + for Covid, as well. Mr Emerita Arana was advised to wear compression hose, which he is doing now. Lisinopril was discontinued and Metoprolol changed to Toprol XL. Mr Emerita Arana was hospitalized at [facility] 1/26/23 after a syncopal episode. He was orthostatic and underwent an echo and carotid doppler. EF on echo was 25-30%. Toprol was cut in half. Mr Emerita Araan is here to follow up to stress test and cardiac monitor. He is here with his daughter and wife. He states he has been doing well the past few days. He is exercising regularly. Past Medical History:   has a past medical history of Asthma, CAD (coronary artery disease), and Hyperlipidemia. Surgical History:   has a past surgical history that includes Coronary artery bypass graft. Social History:   reports that he has never smoked. He has never used smokeless tobacco. He reports that he does not drink alcohol and does not use drugs.     Family History:  family history includes Heart Disease in his father and mother. Allergies:  Gabapentin     Current Outpatient Medications on File Prior to Visit   Medication Sig Dispense Refill    finasteride (PROSCAR) 5 MG tablet TAKE 1 TABLET BY MOUTH EVERY DAY      metoprolol succinate (TOPROL XL) 50 MG extended release tablet Take 25 mg by mouth daily 1/2 tab daily      zinc 50 MG CAPS Take by mouth      Cholecalciferol (VITAMIN D3) 25 MCG (1000 UT) CAPS Take by mouth      Ascorbic Acid (VITAMIN C) 250 MG tablet Take 250 mg by mouth daily      atorvastatin (LIPITOR) 20 MG tablet TAKE 1 TABLET DAILY 90 tablet 3    aspirin 81 MG tablet Take 81 mg by mouth daily. Multiple Vitamins-Minerals (CENTRUM SILVER) TABS Take 1 tablet by mouth daily. nitroGLYCERIN (NITROQUICK) 0.4 MG SL tablet Place 1 tablet under the tongue every 5 minutes as needed for Chest pain. 1 Tablet SL PRN as needed for chest pain 25 tablet 6     No current facility-administered medications on file prior to visit. Review of Systems:   Constitutional: there has been no unanticipated weight loss. No change in energy or activity level   Eyes: No visual changes   ENT: No Headaches, hearing loss or vertigo. No mouth sores or sore throat. Cardiovascular: Reviewed in HPI  Respiratory: No cough or wheezing, no sputum production. No hematemesis. Gastrointestinal: No abdominal pain, appetite loss, blood in stools. No change in bowel or bladder habits. Genitourinary: No nocturia, dysuria, trouble voiding  Musculoskeletal:  No gait disturbance, weakness or joint complaints. Integumentary: No rash or pruritis. Neurological: No headache, change in muscle strength, numbness or tingling. No change in gait, balance, coordination, mood, affect, memory, mentation, behavior. Psychiatric: No anxiety or depression  Endocrine: No malaise or fever  Hematologic/Lymphatic: No abnormal bruising or bleeding, blood clots or swollen lymph nodes.   Allergic/Immunologic: No nasal congestion or hives.    Physical Examination:    Vitals:    02/28/23 1400   BP: 128/72   Site: Left Upper Arm   Position: Sitting   Cuff Size: Medium Adult   Pulse: 77   SpO2: 98%   Weight: 176 lb (79.8 kg)   Height: 5' 8\" (1.727 m)           Wt Readings from Last 3 Encounters:   02/28/23 176 lb (79.8 kg)   01/30/23 177 lb (80.3 kg)   09/08/22 179 lb (81.2 kg)     BP Readings from Last 3 Encounters:   02/28/23 128/72   01/30/23 120/66   09/08/22 112/62     Constitutional and General Appearance:  appears stated age  Respiratory:  Normal excursion and expansion without use of accessory muscles  Resp Auscultation: Normal breath sounds without dullness  Cardiovascular: The apical impulses not displaced  Heart is regular rate and rhythm with normal S1, S2, pacer in the left chest   The carotid upstroke is normal, no bruit noted   JVP is not elevated  Peripheral pulses are symmetrical  There is no clubbing, cyanosis of the extremities  No edema  Femoral Arteries: 2+ and equal  Pedal Pulses: 2+ and equal   Abdomen:  No masses or tenderness  Normal bowel sounds  Neurological/Psychiatric:  Alert and oriented x3; his memory is not normal.   Moves all extremities well  Exhibits normal gait balance and coordination    Assessment:  1. Coronary  artery disease:  reviewed stress test with Mr Beatrice Fernandez and his family  GXT 2/7/23: mild LV enlargement with global hypokinesis and EF 29%. Medium sized inferolateral minimally reversible defect of moderate intensity consistent with infarction in territory of proximal LCx and/or RCA    Echo 1/27/23: EF 25-30%. He has not had an ischemic evaluation for many years; he has no angina. Echo 3/23/22: EF 45-50%. Grade 2 DD. CABG 6/3/05> LIMA-LAD, SVG-diagonal and OM, SVG-posterior ventricular and posterior descending branches of RC system  --GXT Edvin 11/14> ([address]) Abnormal myocardial perfusion study with a large severe perfusion abnormality inferior wall reflecting myocardial scar.  EF 48%  --Echo 11/14> ([address]) EF 45-50%, Aortic valve: Valve area: 2.05cm^2 (Vmax). Left atrium: The  atrium was mildly dilated  --GXT Edvin 3/13> No diagnostic EKG evidence for stress induced ischemia, very frequent PVC's and couplets. Moderate area of decreased perfusion in inferior wall with stress and rest consistent with scar, no evidence for reversible ischemia. EF 45%  --GXT Edvin 7/18/07> small to moderate sized inferolateral fixed defect consistent with infarction   2. Orthostatic hypotension: at LOV: 140/70 lying, 140/70 sitting, 122/60 standing. His BP does not drop that much to suggest that his spell was from this. 3. Other and unspecified hyperlipidemia: labs 3/16/22: ; TRIG 209; HDL 35; LDL 61, Lipitor 20mg daily   4. Carotid stenosis: Stable  Carotid doppler 1/27/23: <50% bilateral   Carotid doppler 4/1/22: <50% bilateral  Carotid doppler 3/17> 16-49% bilateral  Carotid doppler 3/27/12> 16-49% bilateral stenosis  Carotid doppler 2/10> mild (<40%) bilaterally   5. Pacemaker, [employer]: placed at [facility] 11/2014 by Dr. Mallika Ma. PPM checked regularly in my office. Not compatible for MRI  ~ NSVT noted on PPM check in the past  Syncope - VT noted on cardiac monitor    Plan:  monitor and stress test findings discuss at length with Mr Samuel Gallagher, his wife, and daughter. Will refer to EP for upgrade to ICD. Scribe's attestation: This note was scribed in the presence of Dr Paxton Villasenor MD by Afia Lynn RN. The scribe's documentation has been prepared under my direction and personally reviewed by me in its entirety. I confirm that the note above accurately reflects all work, treatment, procedures, and medical decision making performed by me. Thank you for allowing to me to participate in the care of Marvin Morales.

## 2023-02-28 ENCOUNTER — OFFICE VISIT (OUTPATIENT)
Dept: CARDIOLOGY CLINIC | Age: 88
End: 2023-02-28
Payer: MEDICARE

## 2023-02-28 ENCOUNTER — TELEPHONE (OUTPATIENT)
Dept: CARDIOLOGY CLINIC | Age: 88
End: 2023-02-28

## 2023-02-28 VITALS
OXYGEN SATURATION: 98 % | BODY MASS INDEX: 26.67 KG/M2 | HEIGHT: 68 IN | SYSTOLIC BLOOD PRESSURE: 128 MMHG | DIASTOLIC BLOOD PRESSURE: 72 MMHG | HEART RATE: 77 BPM | WEIGHT: 176 LBS

## 2023-02-28 DIAGNOSIS — I10 ESSENTIAL HYPERTENSION, BENIGN: ICD-10-CM

## 2023-02-28 DIAGNOSIS — I25.10 ATHEROSCLEROSIS OF NATIVE CORONARY ARTERY OF NATIVE HEART WITHOUT ANGINA PECTORIS: Primary | ICD-10-CM

## 2023-02-28 DIAGNOSIS — I65.23 BILATERAL CAROTID ARTERY STENOSIS: ICD-10-CM

## 2023-02-28 DIAGNOSIS — E78.49 OTHER HYPERLIPIDEMIA: ICD-10-CM

## 2023-02-28 DIAGNOSIS — R55 SYNCOPE, UNSPECIFIED SYNCOPE TYPE: ICD-10-CM

## 2023-02-28 DIAGNOSIS — Z95.0 PACEMAKER: ICD-10-CM

## 2023-02-28 PROCEDURE — 99214 OFFICE O/P EST MOD 30 MIN: CPT | Performed by: INTERNAL MEDICINE

## 2023-02-28 PROCEDURE — 1123F ACP DISCUSS/DSCN MKR DOCD: CPT | Performed by: INTERNAL MEDICINE

## 2023-02-28 PROCEDURE — G8417 CALC BMI ABV UP PARAM F/U: HCPCS | Performed by: INTERNAL MEDICINE

## 2023-02-28 PROCEDURE — G8484 FLU IMMUNIZE NO ADMIN: HCPCS | Performed by: INTERNAL MEDICINE

## 2023-02-28 PROCEDURE — G8427 DOCREV CUR MEDS BY ELIG CLIN: HCPCS | Performed by: INTERNAL MEDICINE

## 2023-02-28 PROCEDURE — 1036F TOBACCO NON-USER: CPT | Performed by: INTERNAL MEDICINE

## 2023-02-28 RX ORDER — FINASTERIDE 5 MG/1
TABLET, FILM COATED ORAL
COMMUNITY
Start: 2023-01-31

## 2023-02-28 NOTE — TELEPHONE ENCOUNTER
Dr Olga Osuna saw Mr Laya Alfred today. He has a newly diagnosed cardiomyopathy and runs of ventricular tachycardia on monitor that he just finished wearing. Dr Olga Osuna is referring Mr Laya Alfred to  for upgrade to [facility].  Please call patient to get him an appointment in .

## 2023-02-28 NOTE — TELEPHONE ENCOUNTER
Spoke with Patient he is scheduled for 3/2 at 345 with mxa and a device check. Pt v/u. Call complete

## 2023-03-01 PROCEDURE — 93228 REMOTE 30 DAY ECG REV/REPORT: CPT | Performed by: INTERNAL MEDICINE

## 2023-03-01 NOTE — PROGRESS NOTES
Patient comes in for programming evaluation for his pacemaker. [employer] D518 ADVANTIO  All sensing and pacing parameters are within normal range. Battery life 2.5 years  AP 29%.  69%. 5 NSVT episodes noted Last on 3/1/2023, longest 10 seconds. Patient remains on metoprolol. No changes made this Session. lease see interrogation for more detail. Patient will see Dr. Betsy Dougherty and follow up in 3 months in office or remotely.

## 2023-03-02 ENCOUNTER — NURSE ONLY (OUTPATIENT)
Dept: CARDIOLOGY CLINIC | Age: 88
End: 2023-03-02

## 2023-03-02 ENCOUNTER — OFFICE VISIT (OUTPATIENT)
Dept: CARDIOLOGY CLINIC | Age: 88
End: 2023-03-02

## 2023-03-02 VITALS
WEIGHT: 177 LBS | HEIGHT: 68 IN | HEART RATE: 80 BPM | OXYGEN SATURATION: 96 % | BODY MASS INDEX: 26.83 KG/M2 | DIASTOLIC BLOOD PRESSURE: 70 MMHG | SYSTOLIC BLOOD PRESSURE: 126 MMHG

## 2023-03-02 DIAGNOSIS — I95.1 ORTHOSTATIC HYPOTENSION: ICD-10-CM

## 2023-03-02 DIAGNOSIS — I10 ESSENTIAL HYPERTENSION, BENIGN: ICD-10-CM

## 2023-03-02 DIAGNOSIS — I25.10 CORONARY ARTERY DISEASE INVOLVING NATIVE CORONARY ARTERY OF NATIVE HEART WITHOUT ANGINA PECTORIS: ICD-10-CM

## 2023-03-02 DIAGNOSIS — Z95.0 PACEMAKER: ICD-10-CM

## 2023-03-02 DIAGNOSIS — I42.9 CARDIOMYOPATHY, UNSPECIFIED TYPE (HCC): ICD-10-CM

## 2023-03-02 DIAGNOSIS — Z95.0 PACEMAKER: Primary | ICD-10-CM

## 2023-03-02 DIAGNOSIS — I49.5 SINOATRIAL NODE DYSFUNCTION (HCC): Primary | ICD-10-CM

## 2023-03-02 DIAGNOSIS — I47.29 NSVT (NONSUSTAINED VENTRICULAR TACHYCARDIA): ICD-10-CM

## 2023-03-02 NOTE — PROGRESS NOTES
[address]   Electrophysiology Consultation   Date: 3/2/2023  Reason for Consultation: VT, Cardiomyopathy - BiV ICD upgrade    Consult Requesting Physician: Dr. Ronaldo Villarreal    Chief Complaint   Patient presents with    New Patient     Pt reports last pass out was about 1m ago, also reports occasional dizziness. No other cardiac symptoms reported. Loss of Consciousness       CC: VT/ cardiomyopathy   HPI: Lucrecia Smith is a 80 y.o. male with a past medical history of coronary artery disease,  CAD 2005 hypertension, hyperlipidemia, bilateral carotid artery stenosis. . PPM placed in 11/2014 after a syncopal episode with HR with the 30s. He has been following with  Dr. Ronaldo Villarreal. Hospitalized 11/2020 for possible syncopal episodes  and was found to be Covid+. Hospitalized again for syncope 01/26/2023. Found to be orthostatic. LVEF was noted to be 25-30%. Toprol dose was cut in half. Monitor placed     Monitor 01/31/2023 to 03/01/2023 - frequent runs of polymorphic VT, fastest was 4 beats at 134 bpm, longest 5 beats at 128 BPM, PVC burden 10% . Stress test 02/2023 : mild LV enlargement with global hypokinesis and EF 29%. Medium sized inferolateral minimally reversible defect of moderate intensity consistent with infarction in territory of proximal LCx and/or RCA    Interval History: Zakia Catherine presents with his family to discuss cardiomyopathy and possible Biv ICD upgrade. He states he has been dizzy and his daughter states he has been off balance. He has not had any further syncopal episodes. Assessment and plan:  VT/NSVT/PVC    10% PVC burden, 22 episodes NSVT on  monitor 01/2023, the longest is 10 seconds. It seems that one of the episodes correlated with one of his syncopal episodes.    Stress test 02/2023 consistent with Infarct pLCx and or RCA    Toprol was reduced due to syncope/orhtostasis   Continue Toprol    Although we will try Entresto and Jardiance, given his history of syncope and very likely would have relationship to both his episodes of nonsustained VT and hypotension, I do not think we have much room for any more medical therapy. Therefore at this point the option is to proceed with ICD for primary prevention and potentially even secondary prevention to treat the VT episodes that could result in syncope. We will also do an EP study at the time of implant to confirm whether patient is inducible or not. Cardiomyopathy     LVEF per Echo 01/2023 25-30%   Stress test 02/2023 showed LVEF 29%    LVEF per Echo 03/22/2022 45-50%   Start entresto 24-49-  check labs in one week. Start Jardiance 10 mg  daily       We will see how he tolerates  GDMT. Low BP may limit amount of medication he can tolerate         -Patient has a resonable expectation of survival of more than one year.     -Patient is a candidate for AICD implantation for secondary prevention and limited tolerance to GDMT     -I have discussed the procedure, risks, benefits and alternatives in detail with patient and family. I gave printed material about AICD implantation, risks and benefits. The risks including, but not limited to, the risks of bleeding, infection, pain, device malfunction, lead dislodgement, radiation exposure, injury to cardiac and surrounding structures (including pneumothorax), stroke, cardiac perforation, tamponade, need for emergent heart surgery, myocardial infarction and death were discussed in detail. The patient opted to proceed with the BiV ICD  implantation. We discussed the risks and benefits of removing the existing RV lead vs leaving it in place. The patient and the family opted to leave the existing lead in place. Symptomatic Bradycardia   S/p Dual chamber pacemaker 11/21/2014  Dr. Deng Escudero ( [address])     The CIED was interrogated and programmed and I supervised and reviewed all the data.  All findings and changes are in device interrogation sheet and reflect my personal interpretation and changes and is scanned to Epic. 2.5  years remaining, AP 29% ,  69%   0% AT/AF burden per device interrogation today. 5 NSVT episodes noted - last on 03/1/203 - longest 10 seconds   P: ASVP @ 87 BPM     Underlying ASVS @ 81 bpm with 1st degree block       Coronary Artery disease    No angina     NM stress  2/7/23: mild LV enlargement with global hypokinesis and EF 29%. Medium sized inferolateral minimally reversible defect of moderate intensity consistent with infarction in territory of proximal LCx and/or RCA     H/o CABG x3  2005   Continue ASA,Statin, BB    Follows with Dr. Adolfo Mukherjee:   Start Rula Roup and [place] Side   Repeat BMP in one week    EPS studies with device for induction of VT and upgrade to BiVICD     Patient Active Problem List    Diagnosis Date Noted    Orthostatic hypotension 09/10/2021    Syncope 12/10/2020    Bilateral carotid artery stenosis 08/30/2018    Sinoatrial node dysfunction ([address] 09/24/2015    Pacemaker 02/26/2015    Other hyperlipidemia 07/25/2011    Essential hypertension, benign 07/25/2011    CAD (coronary artery disease) 07/25/2011    Other symptoms involving cardiovascular system 07/25/2011     Diagnostic studies:   ECG 3/2/23  SR QTcH 363, QRS 88    NM stress 02/07/2023   Conclusions        Summary    Mild LV enlargement with global hypokinesis and EF 29%    Medium sized inferolateral minimally reversible defect of moderate intensity    consistent with infarction in the territory of the proximal LCx and/or RCA . Echo  01/27/2023 [address]:     1. Left ventricle: The cavity size was mildly dilated. Wall      thickness was normal. Systolic function was severly reduced. The      estimated ejection fraction was in the range of 25% to 30%. Global hypokinesis Unable to assess diastolic function. The      longitudal strain study is abnormal.   2. Left atrium: The atrium was moderately dilated.    3. Right ventricle: The cavity size was normal. Wall thickness was      normal. Systolic function was normal.   4. Pulmonary arteries: Estimated PA peak pressure is 18mm Hg (S). 5. Pericardium, extracardiac: There was no pericardial effusion. Impressions:  No prior study was available for comparison. Echo 03/23/2022  [address]:     1. Left ventricle: The cavity size was normal. Wall thickness was      normal. Systolic function was mildly reduced. The estimated      ejection fraction was in the range of 45% to 50%. Mild diffuse      hypokinesis. Regional wall motion abnormalities cannot be      excluded. Features are consistent with a pseudonormal left      ventricular filling pattern, with concomitant abnormal      relaxation and increased filling pressure (grade 2 diastolic      dysfunction). Acoustic contrast opacification revealed no      evidence ofthrombus. 2. Right ventricle: The cavity size was normal. Wall thickness was      normal. Systolic function was normal.   3. Pulmonary arteries: Systolic pressure could not be accurately      estimated. 4. Pericardium, extracardiac: There was no pericardial effusion. Impressions: The previous study was not available, so comparison   was made to the report of 2014. No significant change compared to   study done in 2014. Echo 1/27/23: EF 25-30%. He has not had an ischemic evaluation for many years; he has no angina. Echo 3/23/22: EF 45-50%. Grade 2 DD. CABG 6/3/05> LIMA-LAD, SVG-diagonal and OM, SVG-posterior ventricular and posterior descending branches of RC system  --GXT Edvin 11/14> ([address]) Abnormal myocardial perfusion study with a large severe perfusion abnormality inferior wall reflecting myocardial scar. EF 48%  --Echo 11/14> ([address])  EF 45-50%, Aortic valve: Valve area: 2.05cm^2 (Vmax). Left atrium: The  atrium was mildly dilated  --GXT Edvin 3/13> No diagnostic EKG evidence for stress induced ischemia, very frequent PVC's and couplets. Moderate area of decreased perfusion in inferior wall with stress and rest consistent with scar, no evidence for reversible ischemia. EF 45%  --GXT Edvin 7/18/07> small to moderate sized inferolateral fixed defect consistent with infarction    I independently reviewed the cardiac diagnostic studies, ECG and relevant imaging studies. Lab Results   Component Value Date    LVEF 29 02/07/2023     No results found for: TSHFT4, TSH    Physical Examination:  Vitals:    03/02/23 1613   BP: 126/70   Pulse: 80   SpO2: 96%      Wt Readings from Last 3 Encounters:   03/02/23 177 lb (80.3 kg)   02/28/23 176 lb (79.8 kg)   01/30/23 177 lb (80.3 kg)       Constitutional: Oriented. No distress. Head: Normocephalic and atraumatic. Mouth/Throat: Oropharynx is clear and moist.   Eyes: Conjunctivae normal. EOM are normal.   Neck: Neck supple. No rigidity. No JVD present. Cardiovascular: Normal rate, regular rhythm, S1&S2. Pulmonary/Chest: Bilateral respiratory sounds. No wheezes, No rhonchi. Abdominal: Soft. Bowel sounds present. No distension, No tenderness. Musculoskeletal: No tenderness. No edema    Lymphadenopathy: Has no cervical adenopathy. Neurological: Alert and oriented. Cranial nerve appears intact, No Gross deficit   Skin: Skin is warm and dry. No rash noted. Psychiatric: Has a normal behavior       Review of System:  [x] Full ROS obtained and negative except as mentioned in HPI    Prior to Admission medications    Medication Sig Start Date End Date Taking?  Authorizing Provider   sacubitril-valsartan (ENTRESTO) 24-26 MG per tablet Take 1 tablet by mouth 2 times daily 3/2/23  Yes Cindee Brittle, MD   empagliflozin (JARDIANCE) 10 MG tablet Take 1 tablet by mouth daily 3/2/23  Yes Cindee Brittle, MD   finasteride (PROSCAR) 5 MG tablet TAKE 1 TABLET BY MOUTH EVERY DAY 1/31/23  Yes Historical Provider, MD   metoprolol succinate (TOPROL XL) 50 MG extended release tablet Take 25 mg by mouth daily 1/2 tab daily Yes Historical Provider, MD   zinc 50 MG CAPS Take by mouth   Yes Historical Provider, MD   Cholecalciferol (VITAMIN D3) 25 MCG (1000 UT) CAPS Take by mouth   Yes Historical Provider, MD   Ascorbic Acid (VITAMIN C) 250 MG tablet Take 250 mg by mouth daily   Yes Historical Provider, MD   atorvastatin (LIPITOR) 20 MG tablet TAKE 1 TABLET DAILY 9/5/19  Yes Quoc Noe MD   nitroGLYCERIN (NITROQUICK) 0.4 MG SL tablet Place 1 tablet under the tongue every 5 minutes as needed for Chest pain. 1 Tablet SL PRN as needed for chest pain 8/30/12  Yes Quoc Noe MD   aspirin 81 MG tablet Take 81 mg by mouth daily. Yes Historical Provider, MD   Multiple Vitamins-Minerals (CENTRUM SILVER) TABS Take 1 tablet by mouth daily. Yes Historical Provider, MD       Past Medical History:   Diagnosis Date    Asthma     CAD (coronary artery disease)     Hyperlipidemia         Past Surgical History:   Procedure Laterality Date    CORONARY ARTERY BYPASS GRAFT         Allergies   Allergen Reactions    Gabapentin        Social History:  Reviewed. reports that he has never smoked. He has never used smokeless tobacco. He reports that he does not drink alcohol and does not use drugs. Family History:  Reviewed. Reviewed. No family history of SCD. Relevant and available labs, and cardiovascular diagnostics reviewed. Reviewed. I independently reviewed all cardiac tracing. I independently reviewed relevant and available cardiac diagnostic tests ECG, CXR, Echo, Stress test, Device interrogation, Holter, CT scan. Outside medical records via Care everywhere reviewed and summarized in H&P above.     Complex medical condition with multiple medical problems affecting prognosis and outcome of EP interventions       - The patient is counseled to follow a low salt diet to assure blood pressure remains controlled for cardiovascular risk factor modification.   - The patient is counseled to avoid excess caffeine, and energy drinks as this may exacerbated ectopy and arrhythmia. - The patient is counseled to get regular exercise 3-5 times per week to control cardiovascular risk factors. All questions and concerns were addressed to the patient/family. Alternatives to my treatment were discussed. I have discussed the above stated plan and the patient verbalized understanding and agreed with the plan. Scribe attestation: This note was scribed in the presence of Isa Melo MD by Christine Beavers RN    I, Dr. Isa Melo personally performed the services described in this documentation as scribed by RN in my presence, and it is both accurate and complete. NOTE: This report was transcribed using voice recognition software. Every effort was made to ensure accuracy, however, inadvertent computerized transcription errors may be present.      Isa Melo MD, [facility], [address] [place] [address]   Office: [phone]  Fax: [phone]

## 2023-03-02 NOTE — LETTER
[address]  EP Procedure Sheet    3/2/23  Sadia Hodges  9/19/1934  EP Procedures  [] Pacemaker implant (single/dual) [] EP Study   [] ICD implant (single/dual) [] Atrial flutter ablation (AMARA Y/N)   [x] Biv implant ICD upgrade from dual PPM  [] Tilt Table   [] Biv implant PPM [] Atrial fibrillation ablation (AMARA Yes)   [] Generator Change (PPM/ICD/BiV) [] SVT ablation   [] Lead revision (RV/LA/RA) (<1 month) [] PVC ablation     [] Lead extraction +/- upgrade (BiV/PPM/ICD) [] VT Ischemic/ non-ischemic   [] Loop implant/ removal [] VT RVOT   [] Cardioversion [] VT Left sided   [] AMARA [] AVN ablation   Equipment  [] Medtronic  [] YVONNE Mapping System   [] [address] [] [address]   [x] Cel-Fi by [employer] [] CryoAblation   [] Biotronik [] Laser Lead Extraction   EP Procedures Scheduling Request  # hours Requested  []1 [x]2 []2-4 [] 4-6 Scheduled  Date:   Specific Day 4-6 weeks  -  Completed    Anesthesia []yes [x]no F/u Date:   CT surgery backup []yes [x]no     Overnight stay      Performing MD []RMM [x]MXA   []MKW [] CMV First vs repeat   []1st [] 2nd [] 3rd   Pre-Procedure Labs / Imaging  [] PT/INR [] Type & cross   [] CBC [] Units PRBC   [] BMP/Mg [] Units FFP   [] Venogram [] Cardiac CTA for Pulmonary vein mapping     RN INITIALS: DW     Patient Instructions  Do not eat or drink after midnight the night prior to procedure  Dx cardiomyopathy, VT   ICD-10 code:  I25.5, i47.2   Medication Instructions: Hold []Xarelto []Eliquis []Coumadin []pradaxa for

## 2023-03-02 NOTE — PATIENT INSTRUCTIONS
No Driving   Our  Radha will call you    You are scheduled for a BiV  iCD upgrade     Our  will call you to discuss a date for you procedure. The Cath Lab will call you a week before your procedure. The night before your procedure you will need to scrub with Hibiclens wash. The day of your procedure you will need to check in at the registration desk, which is in the main lobby at [address] [place] [address] will need to fast for at least 8 hours prior to your procedure. You may take all other medications with a sip of water the morning of your procedure. Please have a responsible adult to drive you home upon discharge. The discharging unit will be giving you discharge instructions. If you have any questions regarding your procedure itself or your medications, please call [phone] and ask to talk to an EP nurse. You will be seen in the office in 1 week for a wound check and then 3 months following implantation.

## 2023-03-06 ENCOUNTER — TELEPHONE (OUTPATIENT)
Dept: CARDIOLOGY CLINIC | Age: 88
End: 2023-03-06

## 2023-03-06 NOTE — TELEPHONE ENCOUNTER
Daughter states on Sat 3/5/23 pt got very dizzy and fell,hitting his lip on the toilet. His BP was low 79/51 pulse 79, 86/51, 95/54 at 10 pm it went to 100/49 pulse 71. Asking to get procedure done asap. Please call to advise.

## 2023-03-06 NOTE — TELEPHONE ENCOUNTER
Discussed with NPSR. Instructed daughter to decrease Entresto to 1/2 tablet BID. Encouraged the patient to remain hydrated and change positions slowly. If symptoms continue patient should be evaluated in ED. Will contact the office to update in a few days. Daughter voiced understanding.  Call complete

## 2023-03-13 ENCOUNTER — TELEPHONE (OUTPATIENT)
Dept: CARDIOLOGY CLINIC | Age: 88
End: 2023-03-13

## 2023-03-13 NOTE — TELEPHONE ENCOUNTER
Spoke to pt's daughter, Maya Stephen. [phone]. She said home health took blood last Thursday she will have them fax the results over to us. Provided the patient with 2 bottles of Jardiance 10mg    Lot: [id number]   EXP: 6/2024     Spoke with the patient and advised that samples have been placed up front and ready for pickup. Pt voiced understanding . Call complete.

## 2023-03-13 NOTE — TELEPHONE ENCOUNTER
Sample requested:   empagliflozin (JARDIANCE) 10 MG tablet     Strength:     Dosage: 1 a day     Patient's call back number: [phone]

## 2023-03-13 NOTE — TELEPHONE ENCOUNTER
Nurse called in stating that she had did the pts labs and sent them over to Dr. Deniz Jorgensen office and they should have the results as labs were drawn 3/9/2023.     Dr. Stahl Oas [phone]

## 2023-03-27 ENCOUNTER — TELEPHONE (OUTPATIENT)
Dept: CARDIOLOGY CLINIC | Age: 88
End: 2023-03-27

## 2023-03-27 NOTE — TELEPHONE ENCOUNTER
Spouse asking if Procedure tomorrow will also be an overnight stay or out patient going home same day. Please advise.

## 2023-03-27 NOTE — TELEPHONE ENCOUNTER
Spoke to patient's wife. Typically this would be outpatient and go home same day. However, advised her to pack bag for an overnight stay just in case they decide to keep him. She verbalized understanding.

## 2023-03-28 ENCOUNTER — APPOINTMENT (OUTPATIENT)
Dept: GENERAL RADIOLOGY | Age: 88
End: 2023-03-28
Attending: INTERNAL MEDICINE
Payer: MEDICARE

## 2023-03-28 ENCOUNTER — HOSPITAL ENCOUNTER (OUTPATIENT)
Dept: CARDIAC CATH/INVASIVE PROCEDURES | Age: 88
Discharge: HOME | End: 2023-03-29
Attending: INTERNAL MEDICINE | Admitting: INTERNAL MEDICINE
Payer: MEDICARE

## 2023-03-28 PROBLEM — I42.9 CARDIOMYOPATHY (HCC): Status: ACTIVE | Noted: 2023-03-28

## 2023-03-28 PROBLEM — I25.5 ISCHEMIC CARDIOMYOPATHY: Status: ACTIVE | Noted: 2023-03-28

## 2023-03-28 PROBLEM — I47.20 VT (VENTRICULAR TACHYCARDIA) (HCC): Status: ACTIVE | Noted: 2023-03-28

## 2023-03-28 LAB
ANION GAP SERPL CALCULATED.3IONS-SCNC: 11 MMOL/L (ref 3–16)
BUN SERPL-MCNC: 15 MG/DL (ref 7–20)
CALCIUM SERPL-MCNC: 9.8 MG/DL (ref 8.3–10.6)
CHLORIDE SERPL-SCNC: 102 MMOL/L (ref 99–110)
CO2 SERPL-SCNC: 26 MMOL/L (ref 21–32)
CREAT SERPL-MCNC: 1.2 MG/DL (ref 0.8–1.3)
DEPRECATED RDW RBC AUTO: 13.2 % (ref 12.4–15.4)
EKG ATRIAL RATE: 91 BPM
EKG DIAGNOSIS: NORMAL
EKG P AXIS: 33 DEGREES
EKG P-R INTERVAL: 260 MS
EKG Q-T INTERVAL: 372 MS
EKG QRS DURATION: 128 MS
EKG QTC CALCULATION (BAZETT): 457 MS
EKG R AXIS: -21 DEGREES
EKG T AXIS: 118 DEGREES
EKG VENTRICULAR RATE: 91 BPM
GFR SERPLBLD CREATININE-BSD FMLA CKD-EPI: 58 ML/MIN/{1.73_M2}
GLUCOSE SERPL-MCNC: 114 MG/DL (ref 70–99)
HCT VFR BLD AUTO: 45.3 % (ref 40.5–52.5)
HGB BLD-MCNC: 15.2 G/DL (ref 13.5–17.5)
MCH RBC QN AUTO: 30.7 PG (ref 26–34)
MCHC RBC AUTO-ENTMCNC: 33.6 G/DL (ref 31–36)
MCV RBC AUTO: 91.3 FL (ref 80–100)
PLATELET # BLD AUTO: 181 K/UL (ref 135–450)
PMV BLD AUTO: 9.8 FL (ref 5–10.5)
POTASSIUM SERPL-SCNC: 4.6 MMOL/L (ref 3.5–5.1)
RBC # BLD AUTO: 4.96 M/UL (ref 4.2–5.9)
SODIUM SERPL-SCNC: 139 MMOL/L (ref 136–145)
WBC # BLD AUTO: 9.5 K/UL (ref 4–11)

## 2023-03-28 PROCEDURE — 71045 X-RAY EXAM CHEST 1 VIEW: CPT

## 2023-03-28 PROCEDURE — 93005 ELECTROCARDIOGRAM TRACING: CPT | Performed by: INTERNAL MEDICINE

## 2023-03-28 PROCEDURE — 2500000003 HC RX 250 WO HCPCS

## 2023-03-28 PROCEDURE — 99153 MOD SED SAME PHYS/QHP EA: CPT

## 2023-03-28 PROCEDURE — C1769 GUIDE WIRE: HCPCS

## 2023-03-28 PROCEDURE — C1900 LEAD, CORONARY VENOUS: HCPCS

## 2023-03-28 PROCEDURE — 93010 ELECTROCARDIOGRAM REPORT: CPT | Performed by: INTERNAL MEDICINE

## 2023-03-28 PROCEDURE — C1894 INTRO/SHEATH, NON-LASER: HCPCS

## 2023-03-28 PROCEDURE — 33225 L VENTRIC PACING LEAD ADD-ON: CPT | Performed by: INTERNAL MEDICINE

## 2023-03-28 PROCEDURE — 36415 COLL VENOUS BLD VENIPUNCTURE: CPT

## 2023-03-28 PROCEDURE — C1892 INTRO/SHEATH,FIXED,PEEL-AWAY: HCPCS

## 2023-03-28 PROCEDURE — 99152 MOD SED SAME PHYS/QHP 5/>YRS: CPT

## 2023-03-28 PROCEDURE — 2580000003 HC RX 258: Performed by: INTERNAL MEDICINE

## 2023-03-28 PROCEDURE — 33263 RMVL & RPLCMT DFB GEN 2 LEAD: CPT | Performed by: INTERNAL MEDICINE

## 2023-03-28 PROCEDURE — 33249 INSJ/RPLCMT DEFIB W/LEAD(S): CPT

## 2023-03-28 PROCEDURE — 33225 L VENTRIC PACING LEAD ADD-ON: CPT

## 2023-03-28 PROCEDURE — 2580000003 HC RX 258

## 2023-03-28 PROCEDURE — 93724 ELEC ALYS ANTITCHYCAR PM SYS: CPT | Performed by: INTERNAL MEDICINE

## 2023-03-28 PROCEDURE — 6370000000 HC RX 637 (ALT 250 FOR IP): Performed by: INTERNAL MEDICINE

## 2023-03-28 PROCEDURE — 6360000002 HC RX W HCPCS

## 2023-03-28 PROCEDURE — C1725 CATH, TRANSLUMIN NON-LASER: HCPCS

## 2023-03-28 PROCEDURE — C1887 CATHETER, GUIDING: HCPCS

## 2023-03-28 PROCEDURE — 2709999900 HC NON-CHARGEABLE SUPPLY

## 2023-03-28 PROCEDURE — 80048 BASIC METABOLIC PNL TOTAL CA: CPT

## 2023-03-28 PROCEDURE — G0378 HOSPITAL OBSERVATION PER HR: HCPCS

## 2023-03-28 PROCEDURE — 99152 MOD SED SAME PHYS/QHP 5/>YRS: CPT | Performed by: INTERNAL MEDICINE

## 2023-03-28 PROCEDURE — 6360000004 HC RX CONTRAST MEDICATION: Performed by: INTERNAL MEDICINE

## 2023-03-28 PROCEDURE — C1882 AICD, OTHER THAN SING/DUAL: HCPCS

## 2023-03-28 PROCEDURE — 85027 COMPLETE CBC AUTOMATED: CPT

## 2023-03-28 RX ORDER — SODIUM CHLORIDE 0.9 % (FLUSH) 0.9 %
5-40 SYRINGE (ML) INJECTION PRN
Status: DISCONTINUED | OUTPATIENT
Start: 2023-03-28 | End: 2023-03-29 | Stop reason: HOSPADM

## 2023-03-28 RX ORDER — ASCORBIC ACID 500 MG
250 TABLET ORAL DAILY
Status: DISCONTINUED | OUTPATIENT
Start: 2023-03-29 | End: 2023-03-29 | Stop reason: HOSPADM

## 2023-03-28 RX ORDER — OXYCODONE HYDROCHLORIDE 5 MG/1
5 TABLET ORAL EVERY 4 HOURS PRN
Status: DISCONTINUED | OUTPATIENT
Start: 2023-03-28 | End: 2023-03-29 | Stop reason: HOSPADM

## 2023-03-28 RX ORDER — ASPIRIN 81 MG/1
81 TABLET ORAL DAILY
Status: DISCONTINUED | OUTPATIENT
Start: 2023-03-29 | End: 2023-03-29 | Stop reason: HOSPADM

## 2023-03-28 RX ORDER — M-VIT,TX,IRON,MINS/CALC/FOLIC 27MG-0.4MG
1 TABLET ORAL DAILY
Status: DISCONTINUED | OUTPATIENT
Start: 2023-03-29 | End: 2023-03-29 | Stop reason: HOSPADM

## 2023-03-28 RX ORDER — ATORVASTATIN CALCIUM 20 MG/1
20 TABLET, FILM COATED ORAL DAILY
Status: DISCONTINUED | OUTPATIENT
Start: 2023-03-29 | End: 2023-03-29 | Stop reason: HOSPADM

## 2023-03-28 RX ORDER — OXYCODONE HYDROCHLORIDE 5 MG/1
10 TABLET ORAL EVERY 4 HOURS PRN
Status: DISCONTINUED | OUTPATIENT
Start: 2023-03-28 | End: 2023-03-29 | Stop reason: HOSPADM

## 2023-03-28 RX ORDER — FINASTERIDE 5 MG/1
5 TABLET, FILM COATED ORAL DAILY
Status: DISCONTINUED | OUTPATIENT
Start: 2023-03-29 | End: 2023-03-29 | Stop reason: HOSPADM

## 2023-03-28 RX ORDER — SODIUM CHLORIDE 0.9 % (FLUSH) 0.9 %
5-40 SYRINGE (ML) INJECTION EVERY 12 HOURS SCHEDULED
Status: DISCONTINUED | OUTPATIENT
Start: 2023-03-28 | End: 2023-03-29 | Stop reason: HOSPADM

## 2023-03-28 RX ORDER — NITROGLYCERIN 0.4 MG/1
0.4 TABLET SUBLINGUAL EVERY 5 MIN PRN
Status: DISCONTINUED | OUTPATIENT
Start: 2023-03-28 | End: 2023-03-29 | Stop reason: HOSPADM

## 2023-03-28 RX ORDER — METOPROLOL SUCCINATE 25 MG/1
25 TABLET, EXTENDED RELEASE ORAL DAILY
Status: DISCONTINUED | OUTPATIENT
Start: 2023-03-29 | End: 2023-03-29 | Stop reason: HOSPADM

## 2023-03-28 RX ORDER — SODIUM CHLORIDE 9 MG/ML
INJECTION, SOLUTION INTRAVENOUS PRN
Status: DISCONTINUED | OUTPATIENT
Start: 2023-03-28 | End: 2023-03-29 | Stop reason: HOSPADM

## 2023-03-28 RX ADMIN — SACUBITRIL AND VALSARTAN 1 TABLET: 24; 26 TABLET, FILM COATED ORAL at 20:53

## 2023-03-28 RX ADMIN — IOPAMIDOL 30 ML: 755 INJECTION, SOLUTION INTRAVENOUS at 16:33

## 2023-03-28 RX ADMIN — SODIUM CHLORIDE, PRESERVATIVE FREE 10 ML: 5 INJECTION INTRAVENOUS at 20:54

## 2023-03-28 NOTE — PROCEDURES
duration (see start and stop times below). Sedation:  2.5 mg Versed, 125 mcg Fentanyl  Sedation start: 1513  Sedation stop: 1610    Selective venography of the left upper extremity was done to rule out compression of the vein, and because of difficult access, which showed patent vein. 2 would not existing pacemaker, we proceeded with doing a noninvasive programmed stimulation ,NIPS. At 500: 270: 200: 240 we were able to induce sustained ventricular tachycardia. Therefore we proceeded with upgrade to BiV ICD. According to patient's wishes, the RV pacing lead from the old pacemaker was capped. The patient was prepped and draped in a sterile fashion. A timeout protocol was completed to identify the patient and the procedure being performed. Pre-sedation evaluation and airway assessment (Mallampatti classification, class lI) was completed. IV sedation was provided with IV Versed, Fentanyl. An incision was made in the left pectoral area after administration of lidocaine. Using electrocautery and blunt dissection, a pocket was opened and the device old pacemaker was removed. Pocket was then extended to accommodate for the new ICD. Central venous access into the left axillary vein was obtained using the modified Seldinger technique. After central venous access was obtained, a sheath was placed in the axillary vein. A right ventricular lead was advanced into position in the apical septum/ under fluoroscopic guidance and using a series of curved stylets. The lead was actively fixated. After confirming appropriate function, the sheath was split and removed. The lead was secured to the underlying tissue using suture material.     Then using a long sheath and guidewire, we gained access to the coronary sinus. We performed a CS venography and found the posterolateral branch of the coronary sinus. Then using a wire, we accessed to this branch and a sheath was advanced into this branch.  Then under

## 2023-03-28 NOTE — H&P
much room for any more medical therapy. Therefore at this point the option is to proceed with ICD for primary prevention and potentially even secondary prevention to treat the VT episodes that could result in syncope. We will also do an EP study at the time of implant to confirm whether patient is inducible or not. Cardiomyopathy     LVEF per Echo 01/2023 25-30%   Stress test 02/2023 showed LVEF 29%    LVEF per Echo 03/22/2022 45-50%   Start entresto 24-49-  check labs in one week. Start Jardiance 10 mg  daily       We will see how he tolerates  GDMT. Low BP may limit amount of medication he can tolerate         -Patient has a resonable expectation of survival of more than one year.     -Patient is a candidate for AICD implantation for secondary prevention and limited tolerance to GDMT     -I have discussed the procedure, risks, benefits and alternatives in detail with patient and family. I gave printed material about AICD implantation, risks and benefits. The risks including, but not limited to, the risks of bleeding, infection, pain, device malfunction, lead dislodgement, radiation exposure, injury to cardiac and surrounding structures (including pneumothorax), stroke, cardiac perforation, tamponade, need for emergent heart surgery, myocardial infarction and death were discussed in detail. The patient opted to proceed with the BiV ICD  implantation. We discussed the risks and benefits of removing the existing RV lead vs leaving it in place. The patient and the family opted to leave the existing lead in place. Symptomatic Bradycardia   S/p Dual chamber pacemaker 11/21/2014  Dr. Pravin Eller ( [address])     The CIED was interrogated and programmed and I supervised and reviewed all the data. All findings and changes are in device interrogation sheet and reflect my personal interpretation and changes and is scanned to Epic.    2.5  years remaining, AP 29% ,  69%   0% AT/AF

## 2023-03-29 VITALS
BODY MASS INDEX: 26.7 KG/M2 | TEMPERATURE: 97.2 F | WEIGHT: 176.2 LBS | SYSTOLIC BLOOD PRESSURE: 108 MMHG | HEART RATE: 72 BPM | HEIGHT: 68 IN | OXYGEN SATURATION: 95 % | RESPIRATION RATE: 16 BRPM | DIASTOLIC BLOOD PRESSURE: 64 MMHG

## 2023-03-29 DIAGNOSIS — Z95.810 ICD (IMPLANTABLE CARDIOVERTER-DEFIBRILLATOR), BIVENTRICULAR, IN SITU: Primary | ICD-10-CM

## 2023-03-29 DIAGNOSIS — I47.20 VT (VENTRICULAR TACHYCARDIA) (HCC): ICD-10-CM

## 2023-03-29 DIAGNOSIS — I25.5 ISCHEMIC CARDIOMYOPATHY: ICD-10-CM

## 2023-03-29 DIAGNOSIS — I49.5 SINOATRIAL NODE DYSFUNCTION (HCC): ICD-10-CM

## 2023-03-29 DIAGNOSIS — I42.8 OTHER CARDIOMYOPATHY (HCC): ICD-10-CM

## 2023-03-29 PROCEDURE — 6370000000 HC RX 637 (ALT 250 FOR IP): Performed by: INTERNAL MEDICINE

## 2023-03-29 PROCEDURE — 2580000003 HC RX 258: Performed by: INTERNAL MEDICINE

## 2023-03-29 PROCEDURE — 99239 HOSP IP/OBS DSCHRG MGMT >30: CPT | Performed by: NURSE PRACTITIONER

## 2023-03-29 RX ADMIN — Medication 1 TABLET: at 08:15

## 2023-03-29 RX ADMIN — ASPIRIN 81 MG: 81 TABLET, COATED ORAL at 08:14

## 2023-03-29 RX ADMIN — METOPROLOL SUCCINATE 25 MG: 25 TABLET, EXTENDED RELEASE ORAL at 08:15

## 2023-03-29 RX ADMIN — EMPAGLIFLOZIN 10 MG: 10 TABLET, FILM COATED ORAL at 08:14

## 2023-03-29 RX ADMIN — ATORVASTATIN CALCIUM 20 MG: 20 TABLET, FILM COATED ORAL at 08:14

## 2023-03-29 RX ADMIN — SACUBITRIL AND VALSARTAN 1 TABLET: 24; 26 TABLET, FILM COATED ORAL at 08:14

## 2023-03-29 RX ADMIN — OXYCODONE HYDROCHLORIDE AND ACETAMINOPHEN 250 MG: 500 TABLET ORAL at 08:14

## 2023-03-29 RX ADMIN — SODIUM CHLORIDE, PRESERVATIVE FREE 10 ML: 5 INJECTION INTRAVENOUS at 08:15

## 2023-03-29 RX ADMIN — FINASTERIDE 5 MG: 5 TABLET, FILM COATED ORAL at 08:15

## 2023-03-29 NOTE — PROGRESS NOTES
Data- discharge order received, pt verbalized agreement to discharge, disposition to previous residence, no needs for HHC/DME. Action- discharge instructions prepared and given to pt and pt's wife, pt verbalized understanding. Medication information packet given r/t NEW and/or CHANGED prescriptions emphasizing name/purpose/side effects, pt verbalized understanding. Discharge instruction summary: Diet- regular, Activity- as tolerated, Primary Care Physician as followsJenn Samaniego -[phone] f/u appointment to be made by pt, immunizations reviewed, prescription medications filled: none. Inpatient surgical procedure precautions reviewed: yes. 1. WEIGHT: Admit Weight: 177 lb (80.3 kg) (03/28/23 1300)        Today  Weight: 176 lb 3.2 oz (79.9 kg) (03/29/23 0214)       2. O2 SAT.: SpO2: 95 % (03/29/23 0800)    Response- Pt belongings gathered, IV removed. Disposition is home (no HHC/DME needs), transported with belongings, taken to lobby via w/c w/ family, no complications.

## 2023-03-29 NOTE — DISCHARGE SUMMARY
Historical Provider, MD   zinc 50 MG CAPS Take by mouth  Historical Provider, MD   Cholecalciferol (VITAMIN D3) 25 MCG (1000 UT) CAPS Take by mouth  Historical Provider, MD   Ascorbic Acid (VITAMIN C) 250 MG tablet Take 250 mg by mouth daily  Historical Provider, MD   atorvastatin (LIPITOR) 20 MG tablet TAKE 1 TABLET DAILY  Virginie Dewey MD   nitroGLYCERIN (NITROQUICK) 0.4 MG SL tablet Place 1 tablet under the tongue every 5 minutes as needed for Chest pain. 1 Tablet SL PRN as needed for chest pain  Virginie Dewey MD   aspirin 81 MG tablet Take 81 mg by mouth daily. Historical Provider, MD   Multiple Vitamins-Minerals (CENTRUM SILVER) TABS Take 1 tablet by mouth daily. Historical Provider, MD      Problem List:  Patient Active Problem List   Diagnosis    Other hyperlipidemia    Essential hypertension, benign    CAD (coronary artery disease)    Other symptoms involving cardiovascular system    Pacemaker    Sinoatrial node dysfunction (HCC)    Bilateral carotid artery stenosis    Syncope    Orthostatic hypotension    Ischemic cardiomyopathy    VT (ventricular tachycardia)    Cardiomyopathy ([facility] [address]    ICD (implantable cardioverter-defibrillator), biventricular, in situ      Assessment & Plan:    VT, PVC   - Noted on monitor   - S/p EP study with inducible VT    - S/p upgrade to [facility] (3/28/23)  - I reviewed device interrogation today. Device functioning normally. - Discussed with patient  - Follow up with device clinic as scheduled     - CXR without PTX  - Left chest AICD stable, no hematoma/swelling/oozing  - Educated on post AICD discharge instructions/restrictions, pt VU    2. Symptomatic Bradycardia   - Hx of [employer] PPM (2014)   - Follow up with device clinic as scheduled    3. CAD  - Hx of CABG (2005)  - Stable  - No complaints of angina  - Continue ASA, ARNI, BB, and statin    4.  Chronic systolic heart failure (NYHA Class II)  - Appears compensated   ~ EF 25% per

## 2023-03-29 NOTE — PLAN OF CARE
Problem: Safety - Adult  Goal: Free from fall injury  3/29/2023 0902 by Ino Swanson RN  Note: Fall precautions in place. Bed alarm on,  call light in reach.

## 2023-03-30 ENCOUNTER — TELEPHONE (OUTPATIENT)
Dept: CARDIOLOGY CLINIC | Age: 88
End: 2023-03-30

## 2023-03-30 NOTE — TELEPHONE ENCOUNTER
Medication Refill    Medication needing refilled:  JARDIANCE    Dosage of the medication: 10mg    How are you taking this medication (QD, BID, TID, QID, PRN):  Take 1 tablet by mouth daily    30 or 90 day supply called in:   90    When will you run out of your medication:    Which Pharmacy are we sending the medication to?:    [facility] [address], [address]  Phone:  [phone]  Fax: [phone]       NOTE:  Clive Manual states that the Pharmacy is requesting a PA for this med.   Please advise

## 2023-04-04 NOTE — PROGRESS NOTES
Patient comes in for programming evaluation for defibrillator. S/p [employer] B139 VIGILANT X4 CRT-D on 3/28/2023 with     All sensing and pacing parameters are within normal range. Battery life 11 years  AP 18%. RVP 95%. LVP 95%. PMT noted. Patient remains on metoprolol. Patients incision is healing nicely. Outside bandage removed. Wound clean, dry and intact. Patient and family educated on wound care. All questions answered. Patient to call the office immediately with any signs on infection. No changes made this Session. Please see interrogation for more detail. Heart Logic is initializing. Patient will follow up in 3 months in office or remotely. Home Monitor received. Showed Patient how to set up in clinic today. They will paired and send transmission once they get home.

## 2023-04-05 ENCOUNTER — NURSE ONLY (OUTPATIENT)
Dept: CARDIOLOGY CLINIC | Age: 88
End: 2023-04-05
Payer: MEDICARE

## 2023-04-05 DIAGNOSIS — I47.20 VT (VENTRICULAR TACHYCARDIA) (HCC): ICD-10-CM

## 2023-04-05 DIAGNOSIS — I49.5 SINOATRIAL NODE DYSFUNCTION (HCC): ICD-10-CM

## 2023-04-05 DIAGNOSIS — Z95.810 ICD (IMPLANTABLE CARDIOVERTER-DEFIBRILLATOR), BIVENTRICULAR, IN SITU: ICD-10-CM

## 2023-04-05 DIAGNOSIS — I25.5 ISCHEMIC CARDIOMYOPATHY: ICD-10-CM

## 2023-04-05 DIAGNOSIS — I42.8 OTHER CARDIOMYOPATHY (HCC): ICD-10-CM

## 2023-04-05 PROCEDURE — 93284 PRGRMG EVAL IMPLANTABLE DFB: CPT | Performed by: INTERNAL MEDICINE

## 2023-05-22 ENCOUNTER — NURSE ONLY (OUTPATIENT)
Dept: CARDIOLOGY CLINIC | Age: 88
End: 2023-05-22
Payer: MEDICARE

## 2023-05-22 DIAGNOSIS — E78.49 OTHER HYPERLIPIDEMIA: Primary | ICD-10-CM

## 2023-05-22 DIAGNOSIS — Z95.810 ICD (IMPLANTABLE CARDIOVERTER-DEFIBRILLATOR), BIVENTRICULAR, IN SITU: ICD-10-CM

## 2023-05-22 DIAGNOSIS — I25.5 ISCHEMIC CARDIOMYOPATHY: Primary | ICD-10-CM

## 2023-05-22 DIAGNOSIS — I50.22 CHRONIC SYSTOLIC HF (HEART FAILURE) (HCC): ICD-10-CM

## 2023-05-22 PROCEDURE — 93297 REM INTERROG DEV EVAL ICPMS: CPT | Performed by: INTERNAL MEDICINE

## 2023-05-22 PROCEDURE — G2066 INTER DEVC REMOTE 30D: HCPCS | Performed by: INTERNAL MEDICINE

## 2023-05-22 NOTE — PROGRESS NOTES
Neel Brown requested lab slips be faxed to [facility] in [facility] to have done prior to OV w/ Dr Devon Santillan in August. Lab slips faxed

## 2023-05-24 NOTE — PROGRESS NOTES
Remote transmission received from patient's CRT-D home monitor. Transmission shows normal sensing and pacing function. No new arrhythmias/ events recorded. AP 15%, RVp 94%, LVaP 94%    Heart Logic @ 4     HF MD will review. See interrogation under cardiology tab in the [address] [place] [address] [address] field for more details. Will continue to monitor remotely. (End of 31-day monitoring period 5/22/23).

## 2023-06-14 ENCOUNTER — NURSE ONLY (OUTPATIENT)
Dept: CARDIOLOGY CLINIC | Age: 88
End: 2023-06-14

## 2023-06-23 NOTE — PROGRESS NOTES
Patient comes in for evaluation for his defibrillator today. BS Rep unable to check device in office today. Patient sent in remote transmission that shows normal function.

## 2023-07-05 NOTE — PROGRESS NOTES
or joint complaints. · Integumentary: No rash or pruritis. · Neurological: No headache, change in muscle strength, numbness or tingling. No change in gait, balance, coordination, mood, affect, memory, mentation, behavior. · Psychiatric: No anxiety or depression  · Endocrine: No malaise or fever  · Hematologic/Lymphatic: No abnormal bruising or bleeding, blood clots or swollen lymph nodes. · Allergic/Immunologic: No nasal congestion or hives. Physical Examination:    Vitals:    02/20/20 1011   BP: 130/60   Site: Left Upper Arm   Position: Sitting   Cuff Size: Medium Adult   Pulse: 60   SpO2: 98%   Weight: 178 lb (80.7 kg)   Height: 5' 8\" (1.727 m)     Wt Readings from Last 3 Encounters:   02/20/20 178 lb (80.7 kg)   09/05/19 169 lb (76.7 kg)   02/21/19 176 lb (79.8 kg)     BP Readings from Last 3 Encounters:   02/20/20 130/60   09/05/19 124/60   02/21/19 (!) 144/72     Constitutional and General Appearance:  appears stated age  Respiratory:  · Normal excursion and expansion without use of accessory muscles  · Resp Auscultation: Normal breath sounds without dullness  Cardiovascular:  · The apical impulses not displaced  · Heart is regular rate and rhythm with normal S1, S2  · The carotid upstroke is normal, no bruit noted   · JVP is not elevated  · Peripheral pulses are symmetrical  · There is no clubbing, cyanosis of the extremities  · No edema  · Femoral Arteries: 2+ and equal  · Pedal Pulses: 2+ and equal   Abdomen:  · No masses or tenderness  · Normal bowel sounds  Neurological/Psychiatric:  · Alert and oriented x3  · Moves all extremities well  · Exhibits normal gait balance and coordination    Assessment:  1.  Coronary  artery disease: Stable, no anginal symptoms  CABG 6/3/05> LIMA-LAD, SVG-diagonal and OM, SVG-posterior ventricular and posterior descending branches of RC system  --GXT Edvin 11/14> ([address]) Abnormal myocardial perfusion study with a large severe perfusion abnormality inferior wall reflecting Path Notes (To The Dermatopathologist): Please check margins.

## 2023-07-17 ENCOUNTER — NURSE ONLY (OUTPATIENT)
Dept: CARDIOLOGY CLINIC | Age: 88
End: 2023-07-17
Payer: MEDICARE

## 2023-07-17 DIAGNOSIS — I25.5 ISCHEMIC CARDIOMYOPATHY: Primary | ICD-10-CM

## 2023-07-17 DIAGNOSIS — I50.22 CHRONIC SYSTOLIC HF (HEART FAILURE) (HCC): ICD-10-CM

## 2023-07-17 DIAGNOSIS — I47.20 VT (VENTRICULAR TACHYCARDIA) (HCC): ICD-10-CM

## 2023-07-17 DIAGNOSIS — Z95.0 PACEMAKER: ICD-10-CM

## 2023-07-17 PROCEDURE — 93296 REM INTERROG EVL PM/IDS: CPT | Performed by: INTERNAL MEDICINE

## 2023-07-17 PROCEDURE — 93297 REM INTERROG DEV EVAL ICPMS: CPT | Performed by: INTERNAL MEDICINE

## 2023-07-17 PROCEDURE — 93295 DEV INTERROG REMOTE 1/2/MLT: CPT | Performed by: INTERNAL MEDICINE

## 2023-07-19 NOTE — PROGRESS NOTES
Remote transmission received from patient's CRT-D home monitor. Transmission shows normal sensing and pacing function. Brief AT noted, longest 6 seconds (Toprol, ASA)    AP 12%, RVp 94%, LVaP 93%    Heart Logic @ 0     EP/ HF MD will review. See interrogation under cardiology tab in the [address] field for more details. Will continue to monitor remotely. (End of 91-day monitoring period 7/17/23).

## 2023-08-02 NOTE — TELEPHONE ENCOUNTER
[facility] [address] [place] [address] called, requesting that the patient gets a refill on the medication listed below. [facility] also requested that the medication start getting refilled in three month quantities, as it would be cheaper for the patient.      sacubitril-valsartan (ENTRESTO) 24-26 MG per tablet [[id number]]     Order Details  Dose: 0.5 tablet Route: Oral Frequency: 2 TIMES DAILY   Dispense Quantity: 30 tablet Refills: 0    Note to Pharmacy: Lot [id number] exp 4/2025     2 bottles         Sig: Take 0.5 tablets by mouth 2 times daily       [facility] [address] [place] [address], [address] [address], [address] [place] [address],[facility] [address]   Phone:  [phone]  Fax:  [phone]       Last Appt: 6/14/23  Next Appt: 8/23/23  Last Refill: 3/29/23

## 2023-08-16 LAB
A/G RATIO: 1.5 (ref 1–2)
ALBUMIN SERPL-MCNC: 4.3 G/DL (ref 3.5–5.7)
ALBUMIN/PROTEIN TOTAL, SER/PL: 7.2 G/DL (ref 6–8.3)
ALP BLD-CCNC: 57 U/L (ref 34–104)
ALT SERPL-CCNC: 75 U/L (ref 7–52)
ANION GAP 4: 10 MMOL/L (ref 7–16)
AST W/O P-5'-P: 43 U/L (ref 13–39)
BILIRUB SERPL-MCNC: 0.7 MG/DL (ref 0.3–1)
BUN BLDV-MCNC: 19 MG/DL (ref 7–25)
CALCIUM SERPL-MCNC: 9.8 MG/DL (ref 8.6–10.2)
CHLORIDE BLD-SCNC: 104 MMOL/L (ref 98–107)
CHOLESTEROL, STONE: 136 MG/DL
CO2: 26 MMOL/L (ref 21–31)
CREAT SERPL-MCNC: 1.23 MG/DL (ref 0.7–1.3)
EGFR MALE: 56 ML/MIN/1.73M2
GLOBULIN: 2.9 G/DL (ref 2.6–4.2)
GLUCOSE: 98 MG/DL (ref 74–109)
HDLC SERPL-MCNC: 34 MG/DL (ref 40–60)
LDL CHOLESTEROL CALCULATED: 70 MG/DL (ref 0–99)
POTASSIUM SERPL-SCNC: 4.4 MMOL/L (ref 3.5–5.1)
SODIUM BLD-SCNC: 140 MMOL/L (ref 136–145)
TRIGL SERPL-MCNC: 160 MG/DL
VLDLC SERPL CALC-MCNC: 32 MG/DL (ref 0–40)

## 2023-08-16 NOTE — PROGRESS NOTES
[facility]   Cardiac Evaluation      Patient: Jacy Hammond  YOB: 1934  Date: 8/23/23     Chief Complaint   Patient presents with    Coronary Artery Disease    Hypertension    Cardiomyopathy            Referring provider: Grecia Torres MD    History of Present Illness:   Mr. Kiko Jaime comes to the office today for follow up of his coronary disease, hypertension, hyperlipidemia. He has a history of coronary disease with CABG in 2005. He had a nuclear stress March, 2013. He had a pacemaker placed 11/14 at [facility] after a syncopal episode with HR 30's. He had been following with both me and [facility] cardiologists for pacemaker checks and office visits. He is now just following with me. Mr Kiko Jaime was hospitalized 11/21/20 at [facility] w/ ? Syncopal episode. He states he was changing paper towel roll at home and felt lightheaded and dizzy. He states he fell back into his bathtub. His wife called 46 and he was taken to [facility]. He was orthostatic and given IV fluids. He tested + for Covid, as well. Mr Kiko Jaime was advised to wear compression hose, which he is doing now. Lisinopril was discontinued and Metoprolol changed to Toprol XL. Mr Kiko Jaime was hospitalized at [facility] 1/26/23 after a syncopal episode. He was orthostatic and underwent an echo and carotid doppler. EF on echo was 25-30%. Toprol was cut in half. He saw Dr Jaosn Martinez and underwent EP study w/ inducible VT on 3/28/23 and had upgrade to Christiana Winslow at that time. Today, Mr Kiko Jaime is here with his wife. He is dizzy all the time. He recently fell out of bed at 2am. He states he is steady on his feet. He fell 2 months ago picking a weed in the front yard. He does not get any exercise and naps during the day on the couch. Percilla Daya denies chest pain, palpitations, SANDHU, or edema. He refuses to use a wa;ler pr a cane.       Past Medical History:   has a past medical history of Asthma, CAD (coronary artery disease), Hyperlipidemia, and Ischemic

## 2023-08-23 ENCOUNTER — OFFICE VISIT (OUTPATIENT)
Dept: CARDIOLOGY CLINIC | Age: 88
End: 2023-08-23
Payer: MEDICARE

## 2023-08-23 VITALS
WEIGHT: 166 LBS | OXYGEN SATURATION: 96 % | BODY MASS INDEX: 25.16 KG/M2 | HEART RATE: 73 BPM | HEIGHT: 68 IN | SYSTOLIC BLOOD PRESSURE: 88 MMHG | DIASTOLIC BLOOD PRESSURE: 52 MMHG

## 2023-08-23 DIAGNOSIS — Z95.0 PACEMAKER: ICD-10-CM

## 2023-08-23 DIAGNOSIS — I47.20 VT (VENTRICULAR TACHYCARDIA) (HCC): ICD-10-CM

## 2023-08-23 DIAGNOSIS — I10 ESSENTIAL HYPERTENSION, BENIGN: ICD-10-CM

## 2023-08-23 DIAGNOSIS — I25.10 CORONARY ARTERY DISEASE INVOLVING NATIVE CORONARY ARTERY OF NATIVE HEART WITHOUT ANGINA PECTORIS: ICD-10-CM

## 2023-08-23 DIAGNOSIS — I50.22 CHRONIC SYSTOLIC HF (HEART FAILURE) (HCC): ICD-10-CM

## 2023-08-23 DIAGNOSIS — I25.5 ISCHEMIC CARDIOMYOPATHY: Primary | ICD-10-CM

## 2023-08-23 PROCEDURE — G8427 DOCREV CUR MEDS BY ELIG CLIN: HCPCS | Performed by: INTERNAL MEDICINE

## 2023-08-23 PROCEDURE — G8417 CALC BMI ABV UP PARAM F/U: HCPCS | Performed by: INTERNAL MEDICINE

## 2023-08-23 PROCEDURE — 1036F TOBACCO NON-USER: CPT | Performed by: INTERNAL MEDICINE

## 2023-08-23 PROCEDURE — 1124F ACP DISCUSS-NO DSCNMKR DOCD: CPT | Performed by: INTERNAL MEDICINE

## 2023-08-23 PROCEDURE — 99214 OFFICE O/P EST MOD 30 MIN: CPT | Performed by: INTERNAL MEDICINE

## 2023-09-06 PROCEDURE — 93297 REM INTERROG DEV EVAL ICPMS: CPT | Performed by: INTERNAL MEDICINE

## 2023-09-06 PROCEDURE — G2066 INTER DEVC REMOTE 30D: HCPCS | Performed by: INTERNAL MEDICINE

## 2023-09-13 NOTE — TELEPHONE ENCOUNTER
Last OV: 23 [facility]   23 MXA   Next OV: 23 DAH   Last labs: 3/28/23 BMP  Last EK23  Last filled:   Disp Refills Start End    empagliflozin (JARDIANCE) 10 MG tablet 90 tablet 1 2023     Sig - Route:  Take 1 tablet by mouth daily - Oral    Sent to pharmacy as: Empagliflozin 10 MG Oral Tablet Annelise Million)    Cosign for Ordering: Accepted by Milka Dudley MD on 2023  6:13 PM    E-Prescribing Status: Receipt confirmed by pharmacy (2023  8:52 AM EDT)    Prior authorization: Closed - Prior Authorization not required for patient/medication

## 2023-09-13 NOTE — TELEPHONE ENCOUNTER
Medication Refill    Medication needing refilled:  JARDIANCE  metoprolol succinate     Dosage of the medication:    10mg   25mg    How are you taking this medication (QD, BID, TID, QID, PRN):  Take 1 tablet by mouth daily  Take 0.5 tablets by mouth daily 1/2 tab daily    30 or 90 day supply called in:    90  45    When will you run out of your medication:    Which Pharmacy are we sending the medication to?:    [address] [address] [address]   Phone:  [phone]  Fax:  [phone]

## 2023-09-21 ENCOUNTER — NURSE ONLY (OUTPATIENT)
Dept: CARDIOLOGY CLINIC | Age: 88
End: 2023-09-21

## 2023-09-21 DIAGNOSIS — Z95.810 ICD (IMPLANTABLE CARDIOVERTER-DEFIBRILLATOR) IN PLACE: Primary | ICD-10-CM

## 2023-09-29 ENCOUNTER — HOSPITAL ENCOUNTER (OUTPATIENT)
Dept: CARDIOLOGY | Age: 88
Discharge: HOME | End: 2023-09-29
Payer: MEDICARE

## 2023-09-29 DIAGNOSIS — I25.5 ISCHEMIC CARDIOMYOPATHY: ICD-10-CM

## 2023-09-29 PROCEDURE — 93308 TTE F-UP OR LMTD: CPT

## 2023-10-03 ENCOUNTER — TELEPHONE (OUTPATIENT)
Dept: CARDIOLOGY CLINIC | Age: 88
End: 2023-10-03

## 2023-10-03 NOTE — TELEPHONE ENCOUNTER
Alyse Schirmer, his device interrogation shows that his heart function is worse and he may be having more arrhythmias.   Jennifer Rodriguez

## 2023-10-05 ENCOUNTER — TELEPHONE (OUTPATIENT)
Dept: CARDIOLOGY CLINIC | Age: 88
End: 2023-10-05

## 2023-10-05 NOTE — TELEPHONE ENCOUNTER
----- Message from Heath Negrete MD sent at 10/5/2023 10:46 AM EDT -----  Call pt EF is 30-35% which is stable.

## 2023-10-07 PROCEDURE — 93297 REM INTERROG DEV EVAL ICPMS: CPT | Performed by: INTERNAL MEDICINE

## 2023-10-07 PROCEDURE — G2066 INTER DEVC REMOTE 30D: HCPCS | Performed by: INTERNAL MEDICINE

## 2023-10-26 PROCEDURE — 93295 DEV INTERROG REMOTE 1/2/MLT: CPT | Performed by: INTERNAL MEDICINE

## 2023-10-26 PROCEDURE — 93296 REM INTERROG EVL PM/IDS: CPT | Performed by: INTERNAL MEDICINE

## 2023-11-15 PROBLEM — R42 DIZZINESS: Status: ACTIVE | Noted: 2023-11-15

## 2023-11-15 NOTE — PROGRESS NOTES
[facility]   Cardiac Evaluation      Patient: Yaneth Trivedi  YOB: 1934  Date: 11/6/23     No chief complaint on file. Referring provider: Sav Dhaliwal MD    History of Present Illness:   Mr. Allyssa Serrano comes to the office today for follow up of his coronary disease, hypertension, hyperlipidemia. He has a history of coronary disease with CABG in 2005. He had a nuclear stress March, 2013. He had a pacemaker placed 11/14 at [facility] after a syncopal episode with HR 30's. He had been following with both me and [facility] cardiologists for pacemaker checks and office visits. He is now just following with me. Mr Allyssa Serrano was hospitalized 11/21/20 at [facility] w/ ? Syncopal episode. He states he was changing paper towel roll at home and felt lightheaded and dizzy. He states he fell back into his bathtub. His wife called 46 and he was taken to [facility]. He was orthostatic and given IV fluids. He tested + for Covid, as well. Mr Allyssa Serrano was advised to wear compression hose, which he is doing now. Lisinopril was discontinued and Metoprolol changed to Toprol XL. Mr Allyssa Serrano was hospitalized at [facility] 1/26/23 after a syncopal episode. He was orthostatic and underwent an echo and carotid doppler. EF on echo was 25-30%. Toprol was cut in half. He saw Dr Bal Edgar and underwent EP study w/ inducible VT on 3/28/23 and had upgrade to Christiana Winslow at that time. Limited echo done 9/29/23 showed Ef 30-35%. Chevis Albanian was previously discontinued due to hypotension and falls. Today, Mr Allyssa Serrano is here with his wife. Past Medical History:   has a past medical history of Asthma, CAD (coronary artery disease), Hyperlipidemia, and Ischemic cardiomyopathy. Surgical History:   has a past surgical history that includes Coronary artery bypass graft. Social History:   reports that he has never smoked. He has never used smokeless tobacco. He reports that he does not drink alcohol and does not use drugs.     Family History:  family
sore throat. Cardiovascular: Reviewed in HPI  Respiratory: No cough or wheezing, no sputum production. No hematemesis. Gastrointestinal: No abdominal pain, appetite loss, blood in stools. No change in bowel or bladder habits. Genitourinary: No nocturia, dysuria, trouble voiding  Musculoskeletal:  No gait disturbance, weakness or joint complaints. Integumentary: No rash or pruritis. Neurological: No headache, change in muscle strength, numbness or tingling. No change in gait, balance, coordination, mood, affect, memory, mentation, behavior. Psychiatric: No anxiety or depression  Endocrine: No malaise or fever  Hematologic/Lymphatic: No abnormal bruising or bleeding, blood clots or swollen lymph nodes. Allergic/Immunologic: No nasal congestion or hives. Physical Examination:    Vitals:    11/16/23 1259   BP: 112/66   Site: Left Upper Arm   Position: Sitting   Cuff Size: Medium Adult   Pulse: 72   SpO2: 97%   Weight: 75.8 kg (167 lb)   Height: 1.727 m (5' 8\")               Wt Readings from Last 3 Encounters:   11/16/23 75.8 kg (167 lb)   08/23/23 75.3 kg (166 lb)   06/14/23 75.9 kg (167 lb 6.4 oz)     BP Readings from Last 3 Encounters:   11/16/23 112/66   08/23/23 (!) 88/52   06/14/23 102/72     Constitutional and General Appearance:  appears stated age  Respiratory:  Normal excursion and expansion without use of accessory muscles  Resp Auscultation: Normal breath sounds without dullness  Cardiovascular:   The apical impulses not displaced  Heart is regular rate and rhythm with normal S1, S2, pacer in the left chest   The carotid upstroke is normal, no bruit noted   JVP is not elevated  Peripheral pulses are symmetrical  There is no clubbing, cyanosis of the extremities  No edema  Femoral Arteries: 2+ and equal  Pedal Pulses: 2+ and equal   Abdomen:  No masses or tenderness  Normal bowel sounds  Neurological/Psychiatric:  Alert and oriented x3; his memory is not normal.   Moves all extremities

## 2023-11-16 ENCOUNTER — OFFICE VISIT (OUTPATIENT)
Dept: CARDIOLOGY CLINIC | Age: 88
End: 2023-11-16

## 2023-11-16 VITALS
OXYGEN SATURATION: 97 % | WEIGHT: 167 LBS | DIASTOLIC BLOOD PRESSURE: 66 MMHG | BODY MASS INDEX: 25.31 KG/M2 | SYSTOLIC BLOOD PRESSURE: 112 MMHG | HEIGHT: 68 IN | HEART RATE: 72 BPM

## 2023-11-16 DIAGNOSIS — I25.10 CORONARY ARTERY DISEASE INVOLVING NATIVE CORONARY ARTERY OF NATIVE HEART WITHOUT ANGINA PECTORIS: Primary | ICD-10-CM

## 2023-11-16 DIAGNOSIS — R42 DIZZINESS: ICD-10-CM

## 2023-11-16 DIAGNOSIS — I95.1 ORTHOSTATIC HYPOTENSION: ICD-10-CM

## 2023-11-16 DIAGNOSIS — E78.49 OTHER HYPERLIPIDEMIA: ICD-10-CM

## 2023-11-16 DIAGNOSIS — Z95.0 PACEMAKER: ICD-10-CM

## 2023-12-04 RX ORDER — EMPAGLIFLOZIN 10 MG/1
10 TABLET, FILM COATED ORAL DAILY
Qty: 90 TABLET | Refills: 3 | Status: SHIPPED | OUTPATIENT
Start: 2023-12-04

## 2023-12-04 NOTE — TELEPHONE ENCOUNTER
Last OV: 23 DAH  Next OV: 24 DAH  Last Labs: BMP 23   Last EK23  Last Fill:   empagliflozin (JARDIANCE) 10 MG tablet 90 tablet 1 2023     Sig - Route:  Take 1 tablet by mouth daily - Oral    Sent to pharmacy as: Empagliflozin 10 MG Oral Tablet (Jardiance)    Cosign for Ordering: Accepted by Tomeka Fields MD on 2023  4:38 PM    E-Prescribing Status: Receipt confirmed by pharmacy (2023  3:39 PM EDT)

## 2023-12-05 RX ORDER — METOPROLOL SUCCINATE 25 MG/1
12.5 TABLET, EXTENDED RELEASE ORAL DAILY
Qty: 45 TABLET | Refills: 3 | Status: SHIPPED | OUTPATIENT
Start: 2023-12-05

## 2023-12-05 NOTE — TELEPHONE ENCOUNTER
Last OV: 23 DAH  Next OV: 24 DAH  Last EK23   Last Fill:   metoprolol succinate (TOPROL XL) 25 MG extended release tablet 45 tablet 3 2023     Sig - Route:  Take 0.5 tablets by mouth daily 1/2 tab daily - Oral    Sent to pharmacy as: Metoprolol Succinate ER 25 MG Oral Tablet Extended Release 24 Hour (TOPROL XL)    Cosign for Ordering: Accepted by Ralph Zaman MD on 2023  6:13 PM    E-Prescribing Status: Receipt confirmed by pharmacy (2023  4:18 PM EDT)

## 2023-12-05 NOTE — TELEPHONE ENCOUNTER
Medication Refill    Medication needing refilled:  metoprolol succinate (TOPROL XL) 25 MG extended release tablet     Dosage of the medication:    How are you taking this medication (QD, BID, TID, QID, PRN):  Take 0.5 tablets by mouth daily 1/2 tab daily     30 or 90 day supply called in:  90 days    When will you run out of your medication:    Which Pharmacy are we sending the medication to?:  [facility], [address] [facility] [address] - [address] [phone]

## 2023-12-08 PROCEDURE — G2066 INTER DEVC REMOTE 30D: HCPCS | Performed by: INTERNAL MEDICINE

## 2023-12-08 PROCEDURE — 93297 REM INTERROG DEV EVAL ICPMS: CPT | Performed by: INTERNAL MEDICINE

## 2024-02-08 ENCOUNTER — TELEPHONE (OUTPATIENT)
Dept: CARDIOLOGY CLINIC | Age: 89
End: 2024-02-08

## 2024-02-08 NOTE — TELEPHONE ENCOUNTER
Monse, wife called to cancel his appt on 02/22 and r/s to another date/time.  Please advise.  Thank you

## 2024-02-09 ENCOUNTER — TELEPHONE (OUTPATIENT)
Dept: CARDIOLOGY CLINIC | Age: 89
End: 2024-02-09

## 2024-02-09 PROCEDURE — 93297 REM INTERROG DEV EVAL ICPMS: CPT | Performed by: INTERNAL MEDICINE

## 2024-02-09 NOTE — TELEPHONE ENCOUNTER
Patients device interrogation suggests that he is volume depleted.  I see that he was admitted to outside hospital in January.  Just wanted to send a message to make sure the patient has follow up/labs.  Beatriz

## 2024-02-10 PROCEDURE — 93295 DEV INTERROG REMOTE 1/2/MLT: CPT | Performed by: INTERNAL MEDICINE

## 2024-02-10 PROCEDURE — 93296 REM INTERROG EVL PM/IDS: CPT | Performed by: INTERNAL MEDICINE

## 2024-02-12 NOTE — TELEPHONE ENCOUNTER
Spoke w/ Mr Andre's wife. She states he is doing ok. He has sundowners which starts around 4pm every day. He is currently not taking any diuretics. HHN and PT are coming to their house and working with him. He sees his pcp tomorrow, 2/13/24 and will see Dr Jang on 2/22/24. Dr Jang has been updated.

## 2024-02-13 NOTE — PROGRESS NOTES
[facility]   Cardiac Evaluation      Patient: Julio Andre  YOB: 1934  Date: 2/22/24     Chief Complaint   Patient presents with    Coronary Artery Disease    Hypertension    Cardiomyopathy    Hyperlipidemia            Referring provider: Cheryl Briggs MD    History of Present Illness:   Mr. Andre comes to the office today for follow up of his coronary disease, hypertension, hyperlipidemia. He has a history of coronary disease with CABG in 2005. He had a nuclear stress March, 2013. He had a pacemaker placed 11/14 at [facility] after a syncopal episode with HR 30's. He had been following with both me and [facility] cardiologists for pacemaker checks and office visits. He is now just following with me.    Mr Andre was hospitalized 11/21/20 at [facility] w/ ? Syncopal episode. He states he was changing paper towel roll at home and felt lightheaded and dizzy. He states he fell back into his bathtub. His wife called 911 and he was taken to [facility]. He was orthostatic and given IV fluids. He tested + for Covid, as well. Mr Andre was advised to wear compression hose, which he is doing now. Lisinopril was discontinued and Metoprolol changed to Toprol XL.     Mr Andre was hospitalized at [facility] 1/26/23 after a syncopal episode. He was orthostatic and underwent an echo and carotid doppler. EF on echo was 25-30%. Toprol was cut in half.  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 Limited echo done 9/29/23 showed Ef 30-35%. Entresto was previously discontinued due to hypotension and falls.     Today, Mr Andre is here with his wife. He fell 11/11/23 and went to [facility] ER. He fell again 1/2/24 and was hospitalized. He had mild rhabdo, CK on admission was 1073. He was discharged to [place] and is home now with his wife. He is no longer on Eliquis after these falls.  A recent device check shows that he is in AF all the time.     His wife and aide is with him

## 2024-02-15 ENCOUNTER — TELEPHONE (OUTPATIENT)
Dept: CARDIOLOGY CLINIC | Age: 89
End: 2024-02-15

## 2024-02-22 ENCOUNTER — OFFICE VISIT (OUTPATIENT)
Dept: CARDIOLOGY CLINIC | Age: 89
End: 2024-02-22
Payer: MEDICARE

## 2024-02-22 VITALS
HEART RATE: 70 BPM | SYSTOLIC BLOOD PRESSURE: 108 MMHG | WEIGHT: 161 LBS | HEIGHT: 68 IN | BODY MASS INDEX: 24.4 KG/M2 | OXYGEN SATURATION: 97 % | DIASTOLIC BLOOD PRESSURE: 60 MMHG

## 2024-02-22 DIAGNOSIS — I25.10 CORONARY ARTERY DISEASE INVOLVING NATIVE CORONARY ARTERY OF NATIVE HEART WITHOUT ANGINA PECTORIS: Primary | ICD-10-CM

## 2024-02-22 DIAGNOSIS — Z95.0 PACEMAKER: ICD-10-CM

## 2024-02-22 DIAGNOSIS — I25.5 ISCHEMIC CARDIOMYOPATHY: ICD-10-CM

## 2024-02-22 DIAGNOSIS — I95.1 ORTHOSTATIC HYPOTENSION: ICD-10-CM

## 2024-02-22 DIAGNOSIS — E78.49 OTHER HYPERLIPIDEMIA: ICD-10-CM

## 2024-02-22 DIAGNOSIS — R06.02 SHORTNESS OF BREATH: ICD-10-CM

## 2024-02-22 PROCEDURE — G8427 DOCREV CUR MEDS BY ELIG CLIN: HCPCS | Performed by: INTERNAL MEDICINE

## 2024-02-22 PROCEDURE — G8484 FLU IMMUNIZE NO ADMIN: HCPCS | Performed by: INTERNAL MEDICINE

## 2024-02-22 PROCEDURE — 99214 OFFICE O/P EST MOD 30 MIN: CPT | Performed by: INTERNAL MEDICINE

## 2024-02-22 PROCEDURE — 1124F ACP DISCUSS-NO DSCNMKR DOCD: CPT | Performed by: INTERNAL MEDICINE

## 2024-02-22 PROCEDURE — 1036F TOBACCO NON-USER: CPT | Performed by: INTERNAL MEDICINE

## 2024-02-22 PROCEDURE — G8420 CALC BMI NORM PARAMETERS: HCPCS | Performed by: INTERNAL MEDICINE

## 2024-03-05 ENCOUNTER — TELEPHONE (OUTPATIENT)
Dept: CARDIOLOGY CLINIC | Age: 89
End: 2024-03-05

## 2024-03-05 NOTE — TELEPHONE ENCOUNTER
Pt's wife states [facility].  Please advise if you need anything else.

## 2024-03-07 NOTE — PROGRESS NOTES
[facility]   Cardiac Evaluation      Patient: Julio Andre  YOB: 1934  Date: 3/21/24     Chief Complaint   Patient presents with    Cardiomyopathy    Hypertension            Referring provider: Cheryl Briggs MD    History of Present Illness:   Mr. Andre comes to the office today for follow up of his coronary disease, hypertension, hyperlipidemia. He has a history of coronary disease with CABG in 2005. He had a nuclear stress March, 2013. He had a pacemaker placed 11/14 at [facility] after a syncopal episode with HR 30's. He had been following with both me and [facility] cardiologists for pacemaker checks and office visits. He is now just following with me.    Mr Andre was hospitalized 11/21/20 at [facility] w/ ? Syncopal episode. He states he was changing paper towel roll at home and felt lightheaded and dizzy. He states he fell back into his bathtub. His wife called 911 and he was taken to [facility]. He was orthostatic and given IV fluids. He tested + for Covid, as well. Mr Andre was advised to wear compression hose, which he is doing now. Lisinopril was discontinued and Metoprolol changed to Toprol XL.     Mr Andre was hospitalized at [facility] 1/26/23 after a syncopal episode. He was orthostatic and underwent an echo and carotid doppler. EF on echo was 25-30%. Toprol was cut in half.  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 Limited echo done 9/29/23 showed Ef 30-35%. Entresto was previously discontinued due to hypotension and falls.     Today, Mr Andre is here with his wife. He fell 11/11/23 and went to [facility] ER. He fell again 1/2/24 and was hospitalized. He had mild rhabdo, CK on admission was 1073. He was discharged to [place] and is home now with his wife. He is no longer on Eliquis after these falls.  A recent device check shows that he is in AF all the time.     His wife and aide is with him today. His wife states he has not been doing

## 2024-03-08 ENCOUNTER — TELEPHONE (OUTPATIENT)
Dept: CARDIOLOGY CLINIC | Age: 89
End: 2024-03-08

## 2024-03-08 NOTE — TELEPHONE ENCOUNTER
CALEB PEACOCK    Please review HL score in MURJ.  See Dr. Jang's recent office visit note: Afib known, controlled Vrates, not on OAC due to falls, declined WATCHMAN w/u, hx of hypotension, instructed to increase fluid intake to 64 oz per day, dementia. Thanks.

## 2024-03-08 NOTE — TELEPHONE ENCOUNTER
I reviewed Suhail's note.  His impedance is at baseline.  And exam shows normal fluid level.  Will watch for now.  AYUSH

## 2024-03-21 ENCOUNTER — NURSE ONLY (OUTPATIENT)
Dept: CARDIOLOGY CLINIC | Age: 89
End: 2024-03-21
Payer: MEDICARE

## 2024-03-21 ENCOUNTER — OFFICE VISIT (OUTPATIENT)
Dept: CARDIOLOGY CLINIC | Age: 89
End: 2024-03-21
Payer: MEDICARE

## 2024-03-21 VITALS
HEART RATE: 72 BPM | HEIGHT: 68 IN | WEIGHT: 162 LBS | BODY MASS INDEX: 24.55 KG/M2 | OXYGEN SATURATION: 97 % | SYSTOLIC BLOOD PRESSURE: 106 MMHG | DIASTOLIC BLOOD PRESSURE: 62 MMHG

## 2024-03-21 DIAGNOSIS — Z95.810 ICD (IMPLANTABLE CARDIOVERTER-DEFIBRILLATOR) IN PLACE: Primary | ICD-10-CM

## 2024-03-21 DIAGNOSIS — I10 ESSENTIAL HYPERTENSION, BENIGN: ICD-10-CM

## 2024-03-21 DIAGNOSIS — E78.49 OTHER HYPERLIPIDEMIA: ICD-10-CM

## 2024-03-21 DIAGNOSIS — I95.1 ORTHOSTATIC HYPOTENSION: ICD-10-CM

## 2024-03-21 DIAGNOSIS — I25.10 CORONARY ARTERY DISEASE INVOLVING NATIVE CORONARY ARTERY OF NATIVE HEART WITHOUT ANGINA PECTORIS: ICD-10-CM

## 2024-03-21 PROCEDURE — 99214 OFFICE O/P EST MOD 30 MIN: CPT | Performed by: INTERNAL MEDICINE

## 2024-03-21 PROCEDURE — 1124F ACP DISCUSS-NO DSCNMKR DOCD: CPT | Performed by: INTERNAL MEDICINE

## 2024-03-21 PROCEDURE — G8427 DOCREV CUR MEDS BY ELIG CLIN: HCPCS | Performed by: INTERNAL MEDICINE

## 2024-03-21 PROCEDURE — 1036F TOBACCO NON-USER: CPT | Performed by: INTERNAL MEDICINE

## 2024-03-21 PROCEDURE — 93284 PRGRMG EVAL IMPLANTABLE DFB: CPT | Performed by: INTERNAL MEDICINE

## 2024-03-21 PROCEDURE — G8484 FLU IMMUNIZE NO ADMIN: HCPCS | Performed by: INTERNAL MEDICINE

## 2024-03-21 PROCEDURE — G8420 CALC BMI NORM PARAMETERS: HCPCS | Performed by: INTERNAL MEDICINE

## 2024-03-26 ENCOUNTER — TELEPHONE (OUTPATIENT)
Dept: CARDIOLOGY CLINIC | Age: 89
End: 2024-03-26

## 2024-05-10 RX ORDER — METOPROLOL SUCCINATE 25 MG/1
12.5 TABLET, EXTENDED RELEASE ORAL DAILY
Qty: 45 TABLET | Refills: 3 | Status: SHIPPED | OUTPATIENT
Start: 2024-05-10

## 2024-05-10 NOTE — TELEPHONE ENCOUNTER
Received refill request for metoprolol succinate from [facility] pharmacy.    Last ov:2024 DA        Last EK2023    Last Refill:2023    Next appointment:2024 CHELLY

## 2024-05-10 NOTE — TELEPHONE ENCOUNTER
Medication Refill    Medication needing refilled:  metoprolol succinate     Dosage of the medication:  25mg    How are you taking this medication (QD, BID, TID, QID, PRN):   Take 0.5 tablets by mouth daily 1/2 tab daily     30 or 90 day supply called in:  45    When will you run out of your medication:    Which Pharmacy are we sending the medication to?:    [id number]  [address], [place]  [address]  Phone:   [phone]   Fax:   [phone]

## 2024-06-18 PROCEDURE — 93297 REM INTERROG DEV EVAL ICPMS: CPT | Performed by: CLINICAL NURSE SPECIALIST

## 2024-06-27 ENCOUNTER — TELEPHONE (OUTPATIENT)
Dept: CARDIOLOGY CLINIC | Age: 89
End: 2024-06-27

## 2024-06-27 NOTE — TELEPHONE ENCOUNTER
Heart logic is 16 which is elevated over the past week  Will send to Dr Jang as patient follows with her

## 2024-06-27 NOTE — TELEPHONE ENCOUNTER
I called and spoke w/ Mr Andre's wife. She states a nurse comes to the house weekly to see Mr Andre. He has not had any LE edema. He has mild shortness of breath, but nothing new or worse than he has been. She states he has been home from NH for 2 weeks and has fallen twice. She has him on a waiting list to go to a NH. I made an appt for Mr Andre to see Dr Jang on 7/11/24.    Per Dr Jang>no med changes at this time, continue to monitor.

## 2024-06-28 NOTE — PROGRESS NOTES
[facility]   Cardiac Evaluation      Patient: Julio Andre  YOB: 1934  Date: 7/11/24     Chief Complaint   Patient presents with    Coronary Artery Disease    Hypertension    Cardiomyopathy            Referring provider: Cheryl Briggs MD    History of Present Illness:   Mr. Andre comes to the office today for follow up of his coronary disease, hypertension, hyperlipidemia. He has a history of coronary disease with CABG in 2005. He had a nuclear stress March, 2013. He had a pacemaker placed 11/14 at [facility] after a syncopal episode with HR 30's. He had been following with both me and [facility] cardiologists for pacemaker checks and office visits. He is now just following with me.    Mr Andre was hospitalized 11/21/20 at [facility] w/ ? Syncopal episode. He states he was changing paper towel roll at home and felt lightheaded and dizzy. He states he fell back into his bathtub. His wife called 911 and he was taken to [facility]. He was orthostatic and given IV fluids. He tested + for Covid, as well. Mr Andre was advised to wear compression hose, which he is doing now. Lisinopril was discontinued and Metoprolol changed to Toprol XL.     Mr Andre was hospitalized at [facility] 1/26/23 after a syncopal episode. He was orthostatic and underwent an echo and carotid doppler. EF on echo was 25-30%. Toprol was cut in half.  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 Limited echo done 9/29/23 showed Ef 30-35%. Entresto was previously discontinued due to hypotension and falls.      He fell 11/11/23 and went to [facility] ER. He fell again 1/2/24 and was hospitalized. He had mild rhabdo, CK on admission was 1073. He was discharged to [place] and is home now with his wife. He is no longer on Eliquis after these falls.  A recent device check shows that he is in AF all the time.     He has fallen a couple of times since last seen. Julio is living at home with his wife.

## 2024-07-05 ENCOUNTER — TELEPHONE (OUTPATIENT)
Dept: CARDIOLOGY CLINIC | Age: 89
End: 2024-07-05

## 2024-07-05 NOTE — TELEPHONE ENCOUNTER
Please review Murj for: \"Latitude Alert transmission d/t HeartLogic HF Index remains above recovery threshold of 6 w current measurement at 39.\"

## 2024-07-05 NOTE — TELEPHONE ENCOUNTER
I spoke to Mrs. Andre. She said he has actually been losing weight, ave 145#. He has no worsening sob, no swelling, No cough other than when eating. He sleeps on 2 pillows with bed flat which is his normal (hospital bed). She states he urinates all the time but saw PCP last week who did not think he had a UTI, told her it was \"normal\" for his age. 's, HR 60-70's. He falls a lot, seen in ER last month with staples placed. He was also a month at a rehab center recently. He takes Jardiance & Toprol; he is not on diuretics.    He has [facility] OT, and she is trying to get aides as well (has had before) as she is at her wit's end caring for him mostly by herself. He is on the waiting list to get into [facility]; she would like to discuss this with Dr. Jang at next week's OV 7/11/24.

## 2024-07-05 NOTE — TELEPHONE ENCOUNTER
Heart logic is 39 which is way above his goal of 6   Will send to Dr Jang as she follows with patient

## 2024-07-11 ENCOUNTER — OFFICE VISIT (OUTPATIENT)
Dept: CARDIOLOGY CLINIC | Age: 89
End: 2024-07-11
Payer: MEDICARE

## 2024-07-11 VITALS
OXYGEN SATURATION: 96 % | SYSTOLIC BLOOD PRESSURE: 104 MMHG | HEIGHT: 68 IN | WEIGHT: 147 LBS | BODY MASS INDEX: 22.28 KG/M2 | DIASTOLIC BLOOD PRESSURE: 60 MMHG | HEART RATE: 72 BPM

## 2024-07-11 DIAGNOSIS — I25.10 CORONARY ARTERY DISEASE INVOLVING NATIVE CORONARY ARTERY OF NATIVE HEART WITHOUT ANGINA PECTORIS: Primary | ICD-10-CM

## 2024-07-11 DIAGNOSIS — Z95.810 ICD (IMPLANTABLE CARDIOVERTER-DEFIBRILLATOR) IN PLACE: ICD-10-CM

## 2024-07-11 DIAGNOSIS — I95.1 ORTHOSTATIC HYPOTENSION: ICD-10-CM

## 2024-07-11 DIAGNOSIS — E78.49 OTHER HYPERLIPIDEMIA: ICD-10-CM

## 2024-07-11 PROCEDURE — G8427 DOCREV CUR MEDS BY ELIG CLIN: HCPCS | Performed by: INTERNAL MEDICINE

## 2024-07-11 PROCEDURE — G8420 CALC BMI NORM PARAMETERS: HCPCS | Performed by: INTERNAL MEDICINE

## 2024-07-11 PROCEDURE — 1124F ACP DISCUSS-NO DSCNMKR DOCD: CPT | Performed by: INTERNAL MEDICINE

## 2024-07-11 PROCEDURE — 1036F TOBACCO NON-USER: CPT | Performed by: INTERNAL MEDICINE

## 2024-07-11 PROCEDURE — 99214 OFFICE O/P EST MOD 30 MIN: CPT | Performed by: INTERNAL MEDICINE

## 2024-07-25 ENCOUNTER — TELEPHONE (OUTPATIENT)
Dept: CARDIOLOGY CLINIC | Age: 89
End: 2024-07-25

## 2024-09-05 ENCOUNTER — TELEPHONE (OUTPATIENT)
Dept: CARDIOLOGY CLINIC | Age: 89
End: 2024-09-05

## 2024-09-05 NOTE — TELEPHONE ENCOUNTER
SHANTELL Berry, with [facility] called to let Dr Jang know that Mr Andre's wife has requested Dr Hernandez take over all of his care including cardiac care since he is a DNRCC. She states Dr Hernandez has notified [facility] to stop monitoring his PPM at this time. Kristi asked what his device is and I relayed to her he has a Bi-V AICD. I also notified her that Mrs Andre was going to bring his remote monitoring device to the NH to continue to do remote check. Kristi stated she has to check on this; she does not know if the monitor is in the facility. I advised Kristi I will speak with Dr Jang about all of this and will also call Mrs Andre to discuss.

## 2024-09-05 NOTE — TELEPHONE ENCOUNTER
We have spoken to Ms Andre and despite his current status she wishes for me to continue in his care since I have been taking care of him since 2005.  DNR does not mean that we ignore the pacer checks and other aspects of his care.

## 2024-09-05 NOTE — TELEPHONE ENCOUNTER
I spoke w/ Mrs Andre. She states she did not request Dr Hernandez take over Julio's cardiac care. She would like Julio to continue to see Dr Jang. She also states she was told not to bring the remote PPM monitoring device to the nursing home since Julio is DNR there is no point in checking it. Mrs Andre will notify Julio's nurse she wants him to see Dr Jang when she goes to visit him today.

## 2024-09-05 NOTE — TELEPHONE ENCOUNTER
I called Kristi and relayed Dr Jang's response. I let her know Mrs Andre will bring in Julio's remote monitor for his Bi-V. She is going to write orders for monthly checks and assessment of monitor each shift. Appt made for Julio to see Dr Jang 9/30/24 at 145 in [facility].

## 2024-09-10 PROCEDURE — 93295 DEV INTERROG REMOTE 1/2/MLT: CPT | Performed by: INTERNAL MEDICINE

## 2024-09-10 PROCEDURE — 93296 REM INTERROG EVL PM/IDS: CPT | Performed by: INTERNAL MEDICINE

## 2024-09-10 NOTE — PROGRESS NOTES
here with his wife, she visits him every day. He falls, so he has bed and chair alarms. Julio states his biggest problem is balance. He denies chest pain, palpitations, dizziness, or edema.     Past Medical History:   has a past medical history of Asthma, CAD (coronary artery disease), Hyperlipidemia, and Ischemic cardiomyopathy.    Surgical History:   has a past surgical history that includes Coronary artery bypass graft.     Social History:   reports that he has never smoked. He has never used smokeless tobacco. He reports that he does not drink alcohol and does not use drugs.    Family History:  family history includes Heart Disease in his father and mother.     Allergies:  Gabapentin     Current Outpatient Medications on File Prior to Visit   Medication Sig Dispense Refill    donepezil (ARICEPT) 5 MG tablet Take 1 tablet by mouth nightly      loratadine (CLARITIN) 10 MG capsule Take 1 capsule by mouth daily      nitrofurantoin, macrocrystal-monohydrate, (MACROBID) 100 MG capsule Take 1 capsule by mouth 2 times daily      senna (SENOKOT) 8.6 MG tablet Take 1 tablet by mouth daily      metoprolol succinate (TOPROL XL) 25 MG extended release tablet Take 0.5 tablets by mouth daily 1/2 tab daily (Patient taking differently: Take 1 tablet by mouth daily) 45 tablet 3    JARDIANCE 10 MG tablet TAKE 1 TABLET DAILY 90 tablet 3    finasteride (PROSCAR) 5 MG tablet TAKE 1 TABLET BY MOUTH EVERY DAY      Cholecalciferol (VITAMIN D3) 25 MCG (1000 UT) CAPS Take by mouth      Ascorbic Acid (VITAMIN C) 250 MG tablet Take 1 tablet by mouth daily      atorvastatin (LIPITOR) 20 MG tablet TAKE 1 TABLET DAILY 90 tablet 3    aspirin 81 MG tablet Take 1 tablet by mouth daily      nitroGLYCERIN (NITROQUICK) 0.4 MG SL tablet Place 1 tablet under the tongue every 5 minutes as needed for Chest pain. 1 Tablet SL PRN as needed for chest pain 25 tablet 6    Multiple Vitamins-Minerals (CENTRUM SILVER) TABS Take 1 tablet by mouth daily       No

## 2024-09-30 ENCOUNTER — OFFICE VISIT (OUTPATIENT)
Dept: CARDIOLOGY CLINIC | Age: 89
End: 2024-09-30
Payer: MEDICARE

## 2024-09-30 VITALS
OXYGEN SATURATION: 98 % | DIASTOLIC BLOOD PRESSURE: 54 MMHG | WEIGHT: 140 LBS | BODY MASS INDEX: 21.22 KG/M2 | HEIGHT: 68 IN | SYSTOLIC BLOOD PRESSURE: 92 MMHG | HEART RATE: 70 BPM

## 2024-09-30 DIAGNOSIS — I25.10 CORONARY ARTERY DISEASE INVOLVING NATIVE CORONARY ARTERY OF NATIVE HEART WITHOUT ANGINA PECTORIS: Primary | ICD-10-CM

## 2024-09-30 DIAGNOSIS — I95.1 ORTHOSTATIC HYPOTENSION: ICD-10-CM

## 2024-09-30 DIAGNOSIS — E78.49 OTHER HYPERLIPIDEMIA: ICD-10-CM

## 2024-09-30 DIAGNOSIS — Z95.810 ICD (IMPLANTABLE CARDIOVERTER-DEFIBRILLATOR) IN PLACE: ICD-10-CM

## 2024-09-30 DIAGNOSIS — Z95.0 PACEMAKER: ICD-10-CM

## 2024-09-30 DIAGNOSIS — I10 ESSENTIAL HYPERTENSION, BENIGN: ICD-10-CM

## 2024-09-30 PROCEDURE — G8420 CALC BMI NORM PARAMETERS: HCPCS | Performed by: INTERNAL MEDICINE

## 2024-09-30 PROCEDURE — 99214 OFFICE O/P EST MOD 30 MIN: CPT | Performed by: INTERNAL MEDICINE

## 2024-09-30 PROCEDURE — 1036F TOBACCO NON-USER: CPT | Performed by: INTERNAL MEDICINE

## 2024-09-30 PROCEDURE — 1124F ACP DISCUSS-NO DSCNMKR DOCD: CPT | Performed by: INTERNAL MEDICINE

## 2024-09-30 PROCEDURE — G8427 DOCREV CUR MEDS BY ELIG CLIN: HCPCS | Performed by: INTERNAL MEDICINE

## 2024-09-30 RX ORDER — LORATADINE 10 MG/1
10 CAPSULE, LIQUID FILLED ORAL DAILY
COMMUNITY

## 2024-09-30 RX ORDER — NITROFURANTOIN 25; 75 MG/1; MG/1
100 CAPSULE ORAL 2 TIMES DAILY
COMMUNITY

## 2024-09-30 RX ORDER — DONEPEZIL HYDROCHLORIDE 5 MG/1
5 TABLET, FILM COATED ORAL NIGHTLY
COMMUNITY
Start: 2024-07-05 | End: 2025-07-05

## 2024-09-30 RX ORDER — SENNOSIDES A AND B 8.6 MG/1
1 TABLET, FILM COATED ORAL DAILY
COMMUNITY

## 2025-02-07 ENCOUNTER — TELEPHONE (OUTPATIENT)
Dept: CARDIOLOGY CLINIC | Age: 89
End: 2025-02-07